# Patient Record
Sex: FEMALE | Race: WHITE
[De-identification: names, ages, dates, MRNs, and addresses within clinical notes are randomized per-mention and may not be internally consistent; named-entity substitution may affect disease eponyms.]

---

## 2020-09-22 ENCOUNTER — HOSPITAL ENCOUNTER (OUTPATIENT)
Dept: HOSPITAL 62 - OD | Age: 40
End: 2020-09-22
Payer: COMMERCIAL

## 2020-09-22 DIAGNOSIS — C50.811: Primary | ICD-10-CM

## 2020-09-22 PROCEDURE — 36415 COLL VENOUS BLD VENIPUNCTURE: CPT

## 2020-09-22 PROCEDURE — 84703 CHORIONIC GONADOTROPIN ASSAY: CPT

## 2020-09-23 ENCOUNTER — HOSPITAL ENCOUNTER (OUTPATIENT)
Dept: HOSPITAL 62 - RAD | Age: 40
End: 2020-09-23
Attending: PHYSICIAN ASSISTANT
Payer: COMMERCIAL

## 2020-09-23 DIAGNOSIS — C50.411: ICD-10-CM

## 2020-09-23 DIAGNOSIS — Z08: Primary | ICD-10-CM

## 2020-09-23 DIAGNOSIS — Z79.899: ICD-10-CM

## 2020-09-23 PROCEDURE — 93306 TTE W/DOPPLER COMPLETE: CPT

## 2020-09-24 NOTE — XCELERA REPORT
[address] [address]

                               Tel: [phone]

                               Fax: [phone]



                      Transthoracic Echocardiogram Report

_______________________________________________________________________________



Name: TOMMY AMARAL

MRN: [id number]                                Age: 40 yrs

Gender: Female                                 : 1980

Patient Status: Outpatient                     Patient Location: RAD

Account #: [id number]

Study Date: 2020 01:43 PM

Accession #: [id number]

_______________________________________________________________________________



Height: 66 in        Weight: 160 lb        BSA: 1.8 m2

_______________________________________________________________________________

Procedure: A two-dimensional transthoracic echocardiogram with color flow and

Doppler was performed. Study Quality: Good.

Reason For Study: CHEMOTHERAPY(Z51.11)



History: CHEMOTHERAPY(Z51.11).

Ordering Physician: PRISCILLA RIVAS



Performed By: Elis Elliott

_______________________________________________________________________________



Interpretation Summary

The left ventricle is normal in size.

There is normal left ventricular wall thickness.

LV EF is > than 60%

Left ventricular systolic function is normal.

Doppler measurements suggest normal left ventricular diastolic function

The left ventricular wall motion is normal.

There is no thrombus.

No ASD , VSD , or PFO.

The right ventricle is normal in size and function.

The right atrium is normal.

The left atrial size is normal.

There is no evidence of mitral valve prolapse.

There is no vegetation seen on the mitral valve.

There is no mitral valve stenosis.

There is a trace amount of mitral regurgitation

There is no aortic valvular vegetation.

There is no aortic valve stenosis

There is no LVOT obstruction.

No aortic regurgitation is present.

There is no tricuspid stenosis.

There is a trace amount of tricuspid regurgitation

Right ventricular systolic pressure is at the upper limits of normal

RVSP is 25 to 30 mm of Hg , with RA mean of 5 to 10.

There is no pulmonic valvular stenosis.

There is no pulmonic valvular regurgitation.

The aortic root is normal size.

The inferior vena cava appeared normal and decreased > 50% with respiration

(RAP 5-10 mmHg)

There is no pericardial effusion.



MMode/2D Measurements & Calculations

RVDd: 2.2 cm   LVIDd: 4.6 cm   FS: 32.5 %            Ao root diam: 2.4 cm



IVSd: 0.89 cm  LVIDs: 3.1 cm   EDV(Teich): 95.4 ml   Ao root area: 4.5 cm2

               LVPWd: 1.1 cm   ESV(Teich): 37.2 ml

                               EF(Teich): 61.0 %



Doppler Measurements & Calculations

MV E max fredy:       MV dec slope:        Ao V2 max:         LV V1 max P.4 cm/sec         769.9 cm/sec2        135.6 cm/sec       3.4 mmHg

MV A max fredy:       MV dec time: 0.12 secAo max PG:         LV V1 max:

86.5 cm/sec                              7.4 mmHg           91.7 cm/sec

MV E/A: 1.1

        _______________________________________________________________

PA V2 max:          TR max fredy:

75.2 cm/sec         226.4 cm/sec

PA max P.3 mmHg TR max P.6 mmHg





Left Ventricle

The left ventricle is normal in size. There is normal left ventricular wall

thickness. LV EF is > than 60%. Left ventricular systolic function is normal.

Doppler measurements suggest normal left ventricular diastolic function. The

left ventricular wall motion is normal. There is no thrombus. No ASD , VSD ,

or PFO.



Right Ventricle

The right ventricle is normal in size and function.



Atria

The right atrium is normal. The left atrial size is normal.



Mitral Valve

There is no evidence of mitral valve prolapse. There is no vegetation seen on

the mitral valve. There is no mitral valve stenosis. There is a trace amount

of mitral regurgitation.





Aortic Valve

There is no aortic valvular vegetation. There is no aortic valve stenosis.

There is no LVOT obstruction. No aortic regurgitation is present.



Tricuspid Valve

There is no tricuspid stenosis. There is a trace amount of tricuspid

regurgitation. Right ventricular systolic pressure is at the upper limits of

normal. RVSP is 25 to 30 mm of Hg , with RA mean of 5 to 10.



Pulmonic Valve

There is no pulmonic valvular stenosis. There is no pulmonic valvular

regurgitation.



Great Vessels

The aortic root is normal size. The inferior vena cava appeared normal and

decreased > 50% with respiration (RAP 5-10 mmHg).





Effusions

There is no pericardial effusion.



_______________________________________________________________________________

_______________________________________________________________________________



Electronically signed by:      Natalie Novoa      on 2020 03:08 PM



CC: PRISCILLA RIVAS, Natalie

## 2020-11-12 ENCOUNTER — HOSPITAL ENCOUNTER (OUTPATIENT)
Dept: HOSPITAL 62 - WI | Age: 40
End: 2020-11-12
Attending: NURSE PRACTITIONER
Payer: COMMERCIAL

## 2020-11-12 DIAGNOSIS — N64.52: ICD-10-CM

## 2020-11-12 DIAGNOSIS — C50.411: Primary | ICD-10-CM

## 2020-11-12 PROCEDURE — 77065 DX MAMMO INCL CAD UNI: CPT

## 2020-11-12 PROCEDURE — 76642 ULTRASOUND BREAST LIMITED: CPT

## 2020-11-12 NOTE — WOMENS IMAGING REPORT
EXAM DESCRIPTION:  3D DX MAMMO LEFT UNILAT; U/S BREAST UNILAT LIMITED



IMAGES COMPLETED DATE/TIME:  11/12/2020 10:30 am; 11/12/2020 10:55 am



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 
BLOODY DISCHARGE C50.411  MALIG NEOPLM OF UPPER-OUTER QUADRANT OF RIGHT FEMALE N64.52  NIPPLE DISCHAR
GE



COMPARISON:  2/20/2020



EXAM PARAMETERS:  Standard craniocaudal and mediolateral oblique images of the breast recorded using 
digital acquisition and breast tomosynthesis.

True lateral exaggerated CC views.

Read with the assistance of CAD.

.[facility] - R2  Version 9.2



LIMITATIONS:  None.



FINDINGS:  BREAST LATERALITY: left

MASSES: No suspicious masses.

CALCIFICATIONS: No new or suspicious calcifications.

ARCHITECTURAL DISTORTION: None.

ASYMMETRY: None noted.

OTHER: No other significant findings.

Ultrasound of the left breast was normal.



IMPRESSION:  No evidence of malignancy.



BREAST DENSITY:  b. There are scattered areas of fibroglandular density.



BIRAD:  ASSESSMENT:  1 Negative.



RECOMMENDATION:  RECOMMENDED FOLLOW UP: Birads 1 or 2: No breast imaging finding to explain the patie
nt's presenting complaint. Further intervention should be based on the degree of clinical suspicion.

SPECIFIC INTERVENTION/IMAGING/CONSULTATION RECOMMENDED:No additional intervention/ imaging/consultati
on needed at this time.

COMMUNICATION:The imaging findings were not discussed with the patient. Her referring provider has be
en notified of the findings.



COMMENT:  The patient has been notified of the results by letter per SA requirements. Additional no
tification policies are in place for contacting patient with suspicious or incomplete findings.

Quality ID #225: The American College of [employer] recommends an annual screening mammogram for women
 aged 40 years or over. This facility utilizes a reminder system to ensure that all patients receive 
reminder letters, and/or direct phone calls for appointments. This includes reminders for routine scr
eening mammograms, diagnostic mammograms, or other Breast Imaging Interventions when appropriate.  Th
is patient will be placed in the appropriate reminder system.



TECHNICAL DOCUMENTATION:  FINDING NUMBER: (1)

ASSESSMENT: (1)

JOB ID:  [id number]

 2011 [employer]- All Rights Reserved



Reading location - IP/workstation name: [email or web address]

## 2020-11-12 NOTE — WOMENS IMAGING REPORT
EXAM DESCRIPTION:  3D DX MAMMO LEFT UNILAT; U/S BREAST UNILAT LIMITED



IMAGES COMPLETED DATE/TIME:  11/12/2020 10:30 am; 11/12/2020 10:55 am



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 
BLOODY DISCHARGE C50.411  MALIG NEOPLM OF UPPER-OUTER QUADRANT OF RIGHT FEMALE N64.52  NIPPLE DISCHAR
GE



COMPARISON:  2/20/2020



EXAM PARAMETERS:  Standard craniocaudal and mediolateral oblique images of the breast recorded using 
digital acquisition and breast tomosynthesis.

True lateral exaggerated CC views.

Read with the assistance of CAD.

.[facility] - R2  Version 9.2



LIMITATIONS:  None.



FINDINGS:  BREAST LATERALITY: left

MASSES: No suspicious masses.

CALCIFICATIONS: No new or suspicious calcifications.

ARCHITECTURAL DISTORTION: None.

ASYMMETRY: None noted.

OTHER: No other significant findings.

Ultrasound of the left breast was normal.



IMPRESSION:  No evidence of malignancy.



BREAST DENSITY:  b. There are scattered areas of fibroglandular density.



BIRAD:  ASSESSMENT:  1 Negative.



RECOMMENDATION:  RECOMMENDED FOLLOW UP: Birads 1 or 2: No breast imaging finding to explain the patie
nt's presenting complaint. Further intervention should be based on the degree of clinical suspicion.

SPECIFIC INTERVENTION/IMAGING/CONSULTATION RECOMMENDED:No additional intervention/ imaging/consultati
on needed at this time.

COMMUNICATION:The imaging findings were not discussed with the patient. Her referring provider has be
en notified of the findings.



COMMENT:  The patient has been notified of the results by letter per SA requirements. Additional no
tification policies are in place for contacting patient with suspicious or incomplete findings.

Quality ID #225: The American College of Radiology recommends an annual screening mammogram for women
 aged 40 years or over. This facility utilizes a reminder system to ensure that all patients receive 
reminder letters, and/or direct phone calls for appointments. This includes reminders for routine scr
eening mammograms, diagnostic mammograms, or other Breast Imaging Interventions when appropriate.  Th
is patient will be placed in the appropriate reminder system.



TECHNICAL DOCUMENTATION:  FINDING NUMBER: (1)

ASSESSMENT: (1)

JOB ID:  [id number]

 2011 [employer]- All Rights Reserved



Reading location - IP/workstation name: [email or web address]

## 2020-11-13 NOTE — XMS REPORT
Patient Summary Document

                          Created on:2020



Patient:TOMMY AMARAL

Sex:Female

:1980

External Reference #:[id number]





Demographics







                          Address                   [address] [place] [address]

 

                          Home Phone                [phone]

 

                          Work Phone                [phone]

 

                          Mobile Phone              [phone]

 

                          Email Address             [email or web address]

 

                          Preferred Language        English

 

                          Marital Status            Unknown

 

                          Yazdanism Affiliation     Unknown

 

                          Race                      Unknown

 

                          Additional Race(s)        White



                                                    [id number]



                                                    Unavailable

 

                          Ethnic Group              Unknown









Author







                          Organization              [facility]

 

                          Address                   [facility] [address]



                                                    [place] [address]

 

                          Phone                     [phone]









Care Team Providers







                    Name                Role                Phone

 

                    PARAS MONSALVE  Primary Care Physician Unavailable

 

                    Ramesh RICHEY        Attending Clinician Unavailable

 

                    PURA            Attending Clinician Unavailable

 

                    KRISTINE BENITES   Attending Clinician Unavailable

 

                    ANKIT PADGETT       Attending Clinician Unavailable

 

                    KRISTINE BENITES   Attending Clinician Unavailable

 

                    ANKIT PADGETT       Attending Clinician Unavailable









Allergies, Adverse Reactions, Alerts







        Allergy Name Allergy Status  Severity Reaction(s) Onset   Inactive Treat

ing 

Comments



                Type                            Date    Date    Clinician 

 

        Adhesive Propensity Active          Other (See                   R

edness



                to adverse                 Comments) 8-14                    and



                reactions                         00:00:                  burnin

g



                to drug                         00                      

 

        Cephalexin Propensity Active          Swelling 0                  



                to adverse                         3-27                    



                reactions                         00:00:                  



                to drug                         00                      

 

        Erythromycin Propensity Active  Mild    Nausea And 0               

   



                to adverse                 Vomiting 4-10                    



                reactions                         00:00:                  



                to drug                         00                      

 

        Erythromycin Propensity Active  Low     Nausea And 2018-0               

   



                to adverse                 Vomiting 4-10                    



                reactions                         00:00:                  



                                                00                      

 

        Erythromycin Allergy to Active          Nausea/Vomit                    

     



        Base    Drug                    ing/Diarrhea                         



        (Ingredient( (Finding)                                                 



        s):                                                             



        erythromycin                                                         



        )                                                               







Medications







       Ordered Filled Start  Stop   Current Ordering Indication Dosage Frequency

 Signature

                          Comments                  Components



      Medication Medication Date  Date  Medication? Clinician                   

(SIG)       



      Name  Name                                                        

 

      Silvadene       2020       Yes               1                       



                  -                                                  



                  00:00:                                                 



                  00                                                    

 

      Ondansetron       2020       No                8mg         Ondansetro   

    



      HCl         0-23                                      n HCl       



                  00:00:                                                 



                  00                                                    

 

      Ado-Trastuz       2020       No                254mg       Ado-Trastu   

    



      umab        0-23                                      zumab       



      Emtansine       00:00:                                     Emtansine      

 



                  00                                                    

 

      Feraheme       2020       No                510mg       Feraheme       



                  0-15                                                  



                  00:00:                                                 



                  00                                                    

 

      Sodium       -       No                20mL        Sodium       



      Chloride       0-15                                      Chloride       



                  00:00:                                                 



                  00                                                    

 

      Sodium       -1       No                50mL        Sodium       



      Chloride       0-08                                      Chloride       



                  00:00:                                                 



                  00                                                    

 

      Feraheme       2020 2020- No                510mg       Feraheme       



                  0-08  10-15                                           



                  00:00: 00:00                                           



                  00    :00                                             

 

      Ondansetron       -       No                8mg         Ondansetro   

    



      HCl         0-02                                      n HCl       



                  00:00:                                                 



                  00                                                    

 

      Ado-Trastuz       2020       No                257mg       Ado-Trastu   

    



      umab        0-02                                      zumab       



      Emtansine       00:00:                                     Emtansine      

 



                  00                                                    

 

      Hydration       2020 2020- No                1000mL                   



      1000mL NS       0-02  10-                                           



                  00:00: 00:00                                           



                  00    :00                                             

 

      Sodium       -0       No                150mL       Sodium       



      Chloride       9-11                                      Chloride       



                  00:00:                                                 



                  00                                                    

 

      Ado-Trastuz              Yes               254mg       Ado-Trastu   

    



      umab        9-11                                      zumab       



      Emtansine       00:00:                                     Emtansine      

 



                  00                                                    

 

      Ondansetron        2020- No                8mg         Ondansetro   

    



      HCl         9-11  10-                               n HCl       



                  00:00: 00:00                                           



                  00    :00                                             

 

      sulfamethox        2020- No          [id number]       Q.5D  Take 2   

    



      azole-trime         09-05                               tablets       



      thoprim       00:00: 23:59                               (320 mg of       



      (BACTRIM       00    :00                                 trimethopr       



      DS) 800-160                                                 im total)     

  



      mg tablet                                                 by mouth 2      

 



                                                            (two)       



                                                            times       



                                                            daily for       



                                                            10 days       

 

      Pertuzumab              No                420mg       Pertuzumab    

   



                                                                    



                  00:00:                                                 



                  00                                                    

 

      Sodium       -0       No                80mL        Sodium       



      Chloride       819                                      Chloride       



                  00:00:                                                 



                  00                                                    

 

      Kanjinti              No                424mg       Kanjinti       



                                                                    



                  00:00:                                                 



                  00                                                    

 

      nitroGLYcer       2020- No          [id number] .5[in_u Q.5D  Place 0

.5       



      in          8-15  08-22                   s]          inches       



      (NITROBID)       00:00: 23:59                               onto the      

 



      2 %         00    :00                                 skin 2       



      ointment                                                 (two)       



                                                            times       



                                                            daily for       



                                                            7 days       



                                                            Apply to       



                                                            the         



                                                            mastectomy       



                                                            skin twice       



                                                            daily       

 

      mupirocin       2020- No                      Q.5D  Apply       



      (BACTROBAN)         08-21                               topically     

  



      2 %         00:00: 23:59                               2 (two)       



      ointment       00    :00                                 times       



                                                            daily for       



                                                            7 days       



                                                            Apply to       



                                                            incision       



                                                            and drain       



                                                            site twice       



                                                            daily       

 

      nitroGLYcer       2020- No          [id number] .5[in_u Q.5D  Place 0

.5       



      in          8-14  08-15                   s]          inches       



      (NITROBID)       00:00: 00:00                               onto the      

 



      2 %         00    :00                                 skin 2       



      ointment                                                 (two)       



                                                            times       



                                                            daily for       



                                                            7 days       



                                                            Apply to       



                                                            the         



                                                            mastectomy       



                                                            skin twice       



                                                            daily       

 

      oxyCODONE       -0 2020- No                5mg   Q6H   Take 1       



      (ROXICODONE                                      tablet (5     

  



      ) 5 MG       00:00: 00:00                               mg total)       



      immediate       00    :00                                 by mouth       



      release                                                 every 6       



      tablet                                                 (six)       



                                                            hours as       



                                                            needed for       



                                                            Pain for       



                                                            up to 30       



                                                            doses       

 

      acetaminoph       - 2020- No                650mg Q8H   Take 1       



      en                                         tablet       



      (TYLENOL)       00:00: 23:59                               (650 mg       



      650 MG ER       00    :00                                 total) by       



      tablet                                                 mouth       



                                                            every 8       



                                                            (eight)       



                                                            hours as       



                                                            needed for       



                                                            Pain for       



                                                            up to 10       



                                                            days        

 

      ondansetron        2020- No                4mg   Q8H   Take 1       



      (ZOFRAN-ODT                                      tablet (4     

  



      ) 4 MG       00:00: 23:59                               mg total)       



      disintegrat       00    :00                                 by mouth      

 



      ing tablet                                                 every 8       



                                                            (eight)       



                                                            hours as       



                                                            needed for       



                                                            Nausea for       



                                                            up to 7       



                                                            days        

 

      ondansetron       0 2020- No                4mg   Q6H   Take 1       



      (ZOFRAN                                      tablet (4       



      ODT) 4 MG       00:00: 00:00                               mg total)      

 



      disintegrat       00    :00                                 by mouth      

 



      ing tablet                                                 every 6       



                                                            (six)       



                                                            hours as       



                                                            needed for       



                                                            Nausea       

 

      oxyCODONE        2020- No                5mg   Q4H   Take 1       



      (ROXICODONE                                      tablet (5     

  



      ) 5 MG       00:00: 00:00                               mg total)       



      immediate       00    :00                                 by mouth       



      release                                                 every 4       



      tablet                                                 (four)       



                                                            hours as       



                                                            needed for       



                                                            Pain        

 

      docusate       -0 2020- No                100mg Q.5D  Take 1       



      (COLACE)                                      capsule       



      100 MG       00:00: 23:59                               (100 mg       



      capsule       00    :00                                 total) by       



                                                            mouth 2       



                                                            (two)       



                                                            times       



                                                            daily for       



                                                            10 days       

 

      LORazepam       2020-0 2020- No                .5mg  [id number] Take 1    

   



      (ATIVAN)                                [id number] tablet      

 



      0.5 MG       00:00: 23:59                         3D    (0.5 mg       



      tablet       00    :00                                 total) by       



                                                            mouth 3       



                                                            (three)       



                                                            times       



                                                            daily as       



                                                            needed for       



                                                            up to 5       



                                                            days        

 

      penicillin       2020-0 2020- No                500mg Q6H   Take 500      

 



      v potassium       7-27  08-06                               mg by       



      500 MG       00:00: 23:59                               mouth       



      tablet       00    :00                                 every 6       



                                                            (six)       



                                                            hours For       



                                                            10 days       

 

      Pertuzumab       2020-0       No                420mg       Pertuzumab    

   



                  7-13                                                  



                  00:00:                                                 



                  00                                                    

 

      Sodium       2020-0       No                120mL       Sodium       



      Chloride       7-13                                      Chloride       



                  00:00:                                                 



                  00                                                    

 

      Kanjinti       2020-0       No                416mg       Kanjinti       



                  7-13                                                  



                  00:00:                                                 



                  00                                                    

 

      Palonosetro       2020-0       No                .25mg       Palonosetr   

    



      n HCl       7-13                                      on HCl       



                  00:00:                                                 



                  00                                                    

 

      Dexamethaso       2020-0       No                20mg        Dexamethas   

    



      ne Sodium       7-13                                      one Sodium      

 



      Phosphate       00:00:                                     Phosphate      

 



                  00                                                    

 

      Famotidine       2020-0       No                20mg        Famotidine    

   



                  7-13                                                  



                  00:00:                                                 



                  00                                                    

 

      CARBOplatin       2020-0       No                750mg       CARBOplati   

    



                  7-13                                      n           



                  00:00:                                                 



                  00                                                    

 

      Pertuzumab       2020-0       No                420mg       Pertuzumab    

   



                  6-22                                                  



                  00:00:                                                 



                  00                                                    

 

      Sodium       2020-0       No                120mL       Sodium       



      Chloride       6-22                                      Chloride       



                  00:00:                                                 



                  00                                                    

 

      Kanjinti       2020-0       No                417mg       Kanjinti       



                  6-22                                                  



                  00:00:                                                 



                  00                                                    

 

      Palonosetro       2020-0       No                .25mg       Palonosetr   

    



      n HCl       6-22                                      on HCl       



                  00:00:                                                 



                  00                                                    

 

      Dexamethaso       2020-0       No                20mg        Dexamethas   

    



      ne Sodium       6-22                                      one Sodium      

 



      Phosphate       00:00:                                     Phosphate      

 



                  00                                                    

 

      Famotidine       2020-0       No                20mg        Famotidine    

   



                  6-22                                                  



                  00:00:                                                 



                  00                                                    

 

      CARBOplatin       2020-0       No                750mg       CARBOplati   

    



                  6-22                                      n           



                  00:00:                                                 



                  00                                                    

 

      Pertuzumab       2020-0       No                420mg       Pertuzumab    

   



                  6-01                                                  



                  00:00:                                                 



                  00                                                    

 

      Sodium       2020-0       No                120mL       Sodium       



      Chloride       6-01                                      Chloride       



                  00:00:                                                 



                  00                                                    

 

      Kanjinti       2020-0       No                411mg       Kanjinti       



                  6-01                                                  



                  00:00:                                                 



                  00                                                    

 

      Palonosetro       2020-0       No                .25mg       Palonosetr   

    



      n HCl       6-01                                      on HCl       



                  00:00:                                                 



                  00                                                    

 

      Dexamethaso       2020-0       No                20mg        Dexamethas   

    



      ne Sodium       6-01                                      one Sodium      

 



      Phosphate       00:00:                                     Phosphate      

 



                  00                                                    

 

      Famotidine       2020-0       No                20mg        Famotidine    

   



                  6-01                                                  



                  00:00:                                                 



                  00                                                    

 

      CARBOplatin       2020-0       No                701mg       CARBOplati   

    



                  6-01                                      n           



                  00:00:                                                 



                  00                                                    

 

      ALPRAZolam       2020-0       Yes               1                       



                  6-01                                                  



                  00:00:                                                 



                  00                                                    

 

      dexAMETHaso       2020-0 2020- No                                        



      ne          5-29  -                                           



      (DECADRON)       00:00: 00:00                                           



      4 MG tablet       00    :00                                             

 

      Diflucan       2020-0 2020- No                1                       



                  5-08  06-22                                           



                  00:00: 09:29                                           



                  00    :50                                             

 

      Pertuzumab       2020-0       No                420mg       Pertuzumab    

   



                  5-04                                                  



                  00:00:                                                 



                  00                                                    

 

      Sodium       2020-0       No                120mL       Sodium       



      Chloride       5-04                                      Chloride       



                  00:00:                                                 



                  00                                                    

 

      Kanjinti       2020-0       No                409mg       Kanjinti       



                  5-04                                                  



                  00:00:                                                 



                  00                                                    

 

      Palonosetro       2020-0       No                .25mg       Palonosetr   

    



      n HCl       5-04                                      on HCl       



                  00:00:                                                 



                  00                                                    

 

      Dexamethaso       2020-0       No                20mg        Dexamethas   

    



      ne Sodium       5-04                                      one Sodium      

 



      Phosphate       00:00:                                     Phosphate      

 



                  00                                                    

 

      Famotidine       2020-0       No                20mg        Famotidine    

   



                  5-04                                                  



                  00:00:                                                 



                  00                                                    

 

      CARBOplatin       2020-0       No                750mg       CARBOplati   

    



                  5-04                                      n           



                  00:00:                                                 



                  00                                                    

 

      Potassium       2020-0 2020- No                20meq                   



      Chloride ER       5-04  09-02                                           



                  00:00: 00:00                                           



                  00    :00                                             

 

      Pertuzumab       2020-0       No                420mg       Pertuzumab    

   



                  4-13                                                  



                  00:00:                                                 



                  00                                                    

 

      Sodium       2020-0       No                120mL       Sodium       



      Chloride       4-13                                      Chloride       



                  00:00:                                                 



                  00                                                    

 

      Kanjinti       2020-0       No                405mg       Kanjinti       



                  4-13                                                  



                  00:00:                                                 



                  00                                                    

 

      Palonosetro       2020-0       No                .25mg       Palonosetr   

    



      n HCl       4-13                                      on HCl       



                  00:00:                                                 



                  00                                                    

 

      Dexamethaso       2020-0       No                20mg        Dexamethas   

    



      ne Sodium       4-13                                      one Sodium      

 



      Phosphate       00:00:                                     Phosphate      

 



                  00                                                    

 

      Famotidine       2020-0       No                20mg        Famotidine    

   



                  4-13                                                  



                  00:00:                                                 



                  00                                                    

 

      CARBOplatin       2020-0       No                750mg       CARBOplati   

    



                  4-13                                      n           



                  00:00:                                                 



                  00                                                    

 

      Magic       2020-0 2020- No                10mL                    



      Mouthwash       -02  -                                           



                  00:00: 00:00                                           



                  00    :00                                             

 

      Dexamethaso       2020-0 2020- No                                        



      ne          4-02  -                                           



                  00:00: 08:57                                           



                  00    :16                                             

 

      Sodium       2020-0       Yes               20mL        Sodium       



      Chloride       3-23                                      Chloride       



                  00:00:                                                 



                  00                                                    

 

      Kanjinti       2020-0 2020- No                540mg       Kanjinti       



                  3-23  08-19                                           



                  00:00: 00:00                                           



                  00    :00                                             

 

      Pertuzumab       -0 2020- No                840mg       Pertuzumab    

   



                  3-23  08-19                                           



                  00:00: 00:00                                           



                  00    :00                                             

 

      Sodium       -0 - No                80mL                    



      Chloride       3-23  08-19                                           



                  00:00: 00:00                                           



                  00    :00                                             

 

      Dexamethaso       -0 2020- No                20mg        Dexamethas   

    



      ne Sodium       3-23  07-13                               one Sodium      

 



      Phosphate       00:00: 00:00                               Phosphate      

 



                  00    :00                                             

 

      DiphenhydrA       -0 - No                50mg                    



      MINE HCl       3-23  07-13                                           



                  00:00: 00:00                                           



                  00    :00                                             

 

      Famotidine       -0 - No                20mg        Famotidine    

   



                  3-23  07-13                                           



                  00:00: 00:00                                           



                  00    :00                                             

 

      Palonosetro       -0 2020- No                .25mg       Palonosetr   

    



      n HCl       3-23  07-13                               on HCl       



                  00:00: 00:00                                           



                  00    :00                                             

 

      CARBOplatin       -0 - No                750mg       CARBOplati   

    



                  3-23  07-13                               n           



                  00:00: 00:00                                           



                  00    :00                                             

 

      Docetaxel       -0 - No                134mg                   



                  3-23  07-13                                           



                  00:00: 00:00                                           



                  00    :00                                             

 

      Ondansetron       0       Yes               1                       



      HCl         3-10                                                  



                  00:00:                                                 



                  00                                                    

 

      Promethazin              Yes               25mg                    



      e HCl       3-10                                                  



                  00:00:                                                 



                  00                                                    

 

      ALPRAZolam       -       Yes               .5MG        Take 0.5 Take 

0.5 



      (XANAX) 0.5       2-12                                      mg by mg by 



      MG tablet       10:57:                                     mouth mouth 



                  51                                        Three (3) Three (3) 



                                                            times a times a 



                                                            day.  day.  

 

      potassium                   Yes                           Take by       



      chloride                                                 mouth       



      (KLOR-CON)                                                             



      10 MEQ ER                                                             



      tablet                                                             

 

      sertraline                   Yes               150mg QD    Take 150       



      (ZOLOFT)                                                 mg by       



      100 MG                                                 mouth once       



      tablet                                                 daily       

 

      omeprazole                   Yes               40mg  QD    Take 40 mg     

  



      (PRILOSEC)                                                 by mouth       



      40 MG DR                                                 once daily       



      capsule                                                             

 

      pediatric                   Yes               1{tbl} QD    Take 1       



      multivitami                                                 tablet by     

  



      n                                                     mouth once       



      (FLINTSTONE                                                 daily       



      S/EXTRA C)                                                             



      chewable                                                             



      tablet                                                             

 

      ALPRAZolam                   Yes               .5mg        Take 0.5       



      (XANAX) 0.5                                                 mg by       



      MG tablet                                                 mouth 3       



                                                            (three)       



                                                            times a       



                                                            day         

 

      VITAMIN D3                   Yes               5000U QD    Take 5,000     

  



      ORAL                                                  Units by       



                                                            mouth once       



                                                            daily       

 

      cetirizine                   Yes               10mg  QD    Take 10 mg     

  



      (ZYRTEC) 10                                                 by mouth      

 



      MG tablet                                                 once daily      

 

 

      fluticasone                   Yes               2{spray QD    Place 2     

  



      propionate                                     }           sprays       



      (FLONASE)                                                 into both       



      50                                                    nostrils       



      mcg/actuati                                                 once daily    

   



      on nasal                                                             



      spray                                                             

 

      Multivitami                   Yes               1                       



      ns                                                                

 

      PriLOSEC                   Yes               1                       

 

      Zoloft                   Yes               1                       

 

      Zyrtec                   No                1                       

 

      Claritin                   Yes               1                       

 

      Zyrtec             2020- No                1                       



                        10-23                                           



                        09:53                                           



                        :52                                             

 

      ascorbic             2020- No                3{capsu QD    Take 3       



      acid/vitami                                le}         capsules      

 



      n E/biotin             00:00                               by mouth       



      (HAIR,             :00                                 once daily       



      SKIN, NAILS                                                             



      WITH BIOTIN                                                             



      ORAL)                                                             

 

      fexofenadin             2020- No                180mg QD    Take 180      

 



      e (ALLEGRA)             07-27                               mg by       



      180 MG             00:00                               mouth once       



      tablet             :00                                 daily       

 

      cetirizine             - No                            Take by       



      HCl/pseudoe                                            mouth       



      phedrine             00:00                                           



      (ZYRTEC-D             :00                                             



      ORAL)                                                             

 

      POTASSIUM             - No                            Take by       



      CHLORIDE                                            mouth       



      ORAL              00:00                                           



                        :00                                             

 

      Allegra             2020- No                1                       



      Allergy                                                        



                        09:31                                           



                        :12                                             

 

      predniSONE             2020- No                3                       



                                                                   



                        09:24                                           



                        :51                                             







Problems







        Condition Condition Condition Status  Onset   Resolution Last    Treatin

g Comments



        Name    Details Category         Date    Date    Treatment Clinician 



                                                        Date            

 

        Dehydration Dehydration Diagnosis active  2020                            



                                        00:00:                          



                                        00                              

 

        Breast  Breast  [id number] Active  2020         



        hematoma hematoma                             12:06:30         



                                        00:00:                          



                                        00                              

 

        S/P right S/P right [id number] Active  2020         



        mastectomy mastectomy                             17:34:00         



                                        00:00:                          



                                        00                              

 

        Patient Patient Diagnosis active                            



        encounter encounter                 3-23                            



        status  status                  00:00:                          



                                        00                              

 

        Malignant Malignant [id number] Active  2020         



        neoplasm of neoplasm of                 3-13            14:57:17        

 



        upper-outer upper-outer                 00:00:                          



        quadrant of quadrant of                 00                              



        right   right                                                   



        breast in breast in                                                 



        female, female,                                                 



        estrogen estrogen                                                 



        receptor receptor                                                 



        positive positive                                                 

 

        Anxiety Anxiety [id number] Active  2020         



                                        2-            12:26:54         



                                        00:00:                          



                                        00                              

 

        Heart   Heart   [id number] Active  2020         



        palpitation palpitation                             12:26:54        

 



        s       s                       00:00:                          



                                        00                              

 

        Gastroesoph Gastroesoph [id number] Active  2020     

    



        ageal   ageal                   4-10            12:26:54         



        reflux  reflux                  00:00:                          



        disease disease                 00                              



        without without                                                 



        esophagitis esophagitis                                                 

 

        Snoring Snoring [id number] Active  2020-10            12:26:54         



                                        00:00:                          



                                        00                              

 

        Eruption Eruption Diagnosis active                                  



        due to drug due to drug                                                 

 

        Sarcoma Sarcoma Diagnosis active                                  



        upper outer upper outer                                                 



        quadrant of quadrant of                                                 



        female  female                                                  



        breast  breast                                                  

 

        Drug-induce Drug-induce Diagnosis active                                

  



        d mucositis d mucositis                                                 

 

        Intestinal Intestinal Diagnosis active                                  



        malabsorpti malabsorpti                                                 



        on      on                                                      

 

        Iron    Iron    Diagnosis active                                  



        deficiency deficiency                                                 



        anemia  anemia                                                  







Procedures







                Procedure       Date / Time Performed Performing Clinician Deviana

e

 

                CORONAVIRUS (COVID-19) SARS-COV-2 2020-08-10 10:30:00 Kiya Uribe 



                PCR PREOPERATIVE SCREEN                                 

 

                BASIC METABOLIC PANEL (BMP) 2020 11:14:00 Elis Uribe

ndace 

 

                COMPLETE BLOOD COUNT (CBC) 2020 11:14:00 Elis Uribe

dace 

 

                breast biopsy   2020 00:00:00                 

 

                Oral surgery                                    

 

                Thyroid Biopsy                                  

 

                caesarean section                                 







Results







           Test Description Test Time  Test Comments Text Results Atomic Results

 Result 

Comments









                FSH             2020 11:30:00                 









                          Test Item    Value        Reference Range Comments









                FSH (test code = FSH) 11.8000         1.5000-116.3000 

 

                LH (test code = LH) 8.7000          0.5000-76.3000  

 

                Estradiol (test code = Estradiol) 165.0000 pg/mL                

  



[id number] 10:14:00





                Test Item       Value           Reference Range Comments

 

                WBC (test code = WBC) 3.0000          4.0000-10.0000  

 

                Lymphocytes % (test code = Lymphocytes %) 19.4000 %       22.400

0-43.6000 

 

                MID% (test code = MID%) 6.7000 %        1.2000-11.2000  

 

                Neutrophils % (test code = Neutrophils %) 73.9000 %       48.900

0-69.9000 

 

                Lymphocytes (test code = Lymphocytes) 0.5000          1.2000-3.2

000   

 

                MID (test code = MID) 0.3000          0.1000-1.1000   

 

                Neutrophils (test code = Neutrophils) 2.2000          1.5000-6.7

000   

 

                RBC (test code = RBC) 3.3400          3.7000-4.9000   

 

                HGB (test code = HGB) 11.5000 g/dL    11.2000-18.0000 

 

                HCT (test code = HCT) 33.6000 %       34.0000-44.0000 

 

                MCV (test code = MCV) 100.6000 fL     80.0000-94.0000 

 

                MCH (test code = MCH) 34.6000 pg      27.0000-34.0000 

 

                MCHC (test code = MCHC) 34.3000 g/dL    31.5000-36.0000 

 

                RDW (test code = RDW) 14.6000         11.0000-18.0000 

 

                PLT (test code = PLT) 171.0000        140.0000-440.0000 

 

                MPV (test code = MPV) 7.8000 fL       6.8000-10.6000  



[id number] 09:29:00





                Test Item       Value           Reference Range Comments

 

                WBC (test code = WBC) 2.5000          4.0000-10.0000  

 

                Lymphocytes % (test code = Lymphocytes %) 23.3000 %       22.400

0-43.6000 

 

                MID% (test code = MID%) 7.1000 %        1.2000-11.2000  

 

                Neutrophils % (test code = Neutrophils %) 69.6000 %       48.900

0-69.9000 

 

                Lymphocytes (test code = Lymphocytes) 0.5000          1.2000-3.2

000   

 

                MID (test code = MID) 0.3000          0.1000-1.1000   

 

                Neutrophils (test code = Neutrophils) 1.7000          1.5000-6.7

000   

 

                RBC (test code = RBC) 3.2000          3.7000-4.9000   

 

                HGB (test code = HGB) 10.9000 g/dL    11.2000-18.0000 

 

                HCT (test code = HCT) 32.1000 %       34.0000-44.0000 

 

                MCV (test code = MCV) 100.1000 fL     80.0000-94.0000 

 

                MCH (test code = MCH) 34.1000 pg      27.0000-34.0000 

 

                MCHC (test code = MCHC) 34.0000 g/dL    31.5000-36.0000 

 

                RDW (test code = RDW) 14.8000         11.0000-18.0000 

 

                PLT (test code = PLT) 126.0000        140.0000-440.0000 

 

                MPV (test code = MPV) 8.5000 fL       6.8000-10.6000  



[id number] 09:26:00





                Test Item       Value           Reference Range Comments

 

                WBC (test code = WBC) 3.0000          4.0000-10.0000  

 

                Lymphocytes % (test code = Lymphocytes %) 29.6000 %       22.400

0-43.6000 

 

                MID% (test code = MID%) 7.1000 %        .-  

 

                Neutrophils % (test code = Neutrophils %) 63.3000 %       48.900

0-69.9000 

 

                Lymphocytes (test code = Lymphocytes) 0.9000          1.2000-3.2

000   

 

                MID (test code = MID) 0.2000          0.1000-1.1000   

 

                Neutrophils (test code = Neutrophils) 1.9000          1.5000-6.7

000   

 

                RBC (test code = RBC) 3.5100          3.7000-4.9000   

 

                HGB (test code = HGB) 12.1000 g/dL    11.-18.0000 

 

                HCT (test code = HCT) 34.5000 %       34.0000-44.0000 

 

                MCV (test code = MCV) 98.3000 fL      80.0000-94.0000 

 

                MCH (test code = MCH) 34.5000 pg      27.0000-34.0000 

 

                MCHC (test code = MCHC) 35.1000 g/dL    31.5000-36.0000 

 

                RDW (test code = RDW) 14.0000         11.0000-18.0000 

 

                PLT (test code = PLT) 109.0000        140.0000-440.0000 

 

                MPV (test code = MPV) 8.4000 fL       6.8000-10.6000  



[id number] 09:36:00





                Test Item       Value           Reference Range Comments

 

                WBC (test code = WBC) 4.2000          4.0000-10.0000  

 

                Lymphocytes % (test code = Lymphocytes %) 26.0000 %       22.400

0-43.6000 

 

                MID% (test code = MID%) 7.3000 %        .-  

 

                Neutrophils % (test code = Neutrophils %) 66.7000 %       48.900

0-69.9000 

 

                Lymphocytes (test code = Lymphocytes) 1.1000          1.2000-3.2

000   

 

                MID (test code = MID) 0.3000          0.1000-1.1000   

 

                Neutrophils (test code = Neutrophils) 2.8000          1.5000-6.7

000   

 

                RBC (test code = RBC) 3.7200          3.7000-4.9000   

 

                HGB (test code = HGB) 12.4000 g/dL    11.2000-18.0000 

 

                HCT (test code = HCT) 37.1000 %       34.0000-44.0000 

 

                MCV (test code = MCV) 99.5000 fL      80.0000-94.0000 

 

                MCH (test code = MCH) 33.3000 pg      27.0000-34.0000 

 

                MCHC (test code = MCHC) 33.4000 g/dL    31.5000-36.0000 

 

                RDW (test code = RDW) 14.7000         11.0000-18.0000 

 

                PLT (test code = PLT) 270.0000        140.0000-440.0000 

 

                MPV (test code = MPV) 8.4000 fL       6.8000-10.6000  



[id number] 09:36:00





                Test Item       Value           Reference Range Comments

 

                Creatinine (test code = Creatinine) 0.6000 mg/dL    0.5000-1.200

0   

 

                Cr Clearance (Est) (test code = Cr 139.7200        75.0000-115.0

000 



                Clearance (Est))                                 

 

                Glucose (test code = Glucose) 89.0000 mg/dL   70.0000-118.0000 

 

                BUN (test code = BUN) 18.0000 mg/dL   7.0000-22.0000  

 

                Sodium (test code = Sodium) 137.0000 mmol/L 128.0000-145.0000 

 

                Potassium (test code = Potassium) 3.8000 mmol/L   3.6000-5.1000 

  

 

                Chloride (test code = Chloride) 102.0000 mmol/L 96.0000-108.0000

 

 

                CO2 (test code = CO2) 29.0000 mmol/L  18.0000-33.0000 

 

                Calcium (test code = Calcium) 9.1600 mg/dL    8.0000-10.3000  

 

                Alkaline Phosphatase (test code = Alkaline 56.0000         42.00

.0000 



                Phosphatase)                                    

 

                ALT (SGPT) (test code = ALT (SGPT)) 17.0000         10.0000-47.0

000 

 

                AST (SGOT) (test code = AST (SGOT)) 22.0000         11.0000-37.0

000 

 

                Bilirubin, Total (test code = Bilirubin, 0.5000 mg/dL    0.0000-

1.6000   



                Total)                                          

 

                Albumin (test code = Albumin) 4.4000 g/dL     3.5000-5.5000   

 

                Protein, Total (test code = Protein, 6.4000 g/dL     6.4000-8.10

00   



                Total)                                          

 

                eGFR -American (test code = eGFR 135.0000        60.0000-

200.0000 



                -American)                                 

 

                eGFR Non--American (test code = 111.0000        60.0000-2

00.0000 



                eGFR Non--American)                                 



[id number] 08:28:00





                Test Item       Value           Reference Range Comments

 

                WBC (test code = WBC) 5.2000          4.0000-10.0000  

 

                Lymphocytes % (test code = Lymphocytes %) 35.0000 %       22.400

0-43.6000 

 

                MID% (test code = MID%) 7.5000 %        1.-11.  

 

                Neutrophils % (test code = Neutrophils %) 57.5000 %       48.900

0-69.9000 

 

                Lymphocytes (test code = Lymphocytes) 1.8000          1.2000-3.2

000   

 

                MID (test code = MID) 0.4000          0.1000-1.1000   

 

                Neutrophils (test code = Neutrophils) 3.0000          1.5000-6.7

000   

 

                RBC (test code = RBC) 3.2400          3.7000-4.9000   

 

                HGB (test code = HGB) 11.1000 g/dL    11.2000-18.0000 

 

                HCT (test code = HCT) 32.8000 %       34.0000-44.0000 

 

                MCV (test code = MCV) 101.3000 fL     80.0000-94.0000 

 

                MCH (test code = MCH) 34.4000 pg      27.0000-34.0000 

 

                MCHC (test code = MCHC) 34.0000 g/dL    31.5000-36.0000 

 

                RDW (test code = RDW) 14.4000         11.0000-18.0000 

 

                PLT (test code = PLT) 197.0000        140.0000-440.0000 

 

                MPV (test code = MPV) 9.1000 fL       6.8000-10.6000  



[id number] 08:33:00





                Test Item       Value           Reference Range Comments

 

                WBC (test code = WBC) 4.9000          4.0000-10.0000  

 

                Lymphocytes % (test code = Lymphocytes %) 39.4000 %       22.400

0-43.6000 

 

                MID% (test code = MID%) 8.0000 %        .-11.  

 

                Neutrophils % (test code = Neutrophils %) 52.6000 %       48.900

0-69.9000 

 

                Lymphocytes (test code = Lymphocytes) 1.9000          1.2000-3.2

000   

 

                MID (test code = MID) 0.5000          0.1000-1.1000   

 

                Neutrophils (test code = Neutrophils) 2.5000          1.5000-6.7

000   

 

                RBC (test code = RBC) 3.3900          3.7000-4.9000   

 

                HGB (test code = HGB) 11.9000 g/dL    11.2000-18.0000 

 

                HCT (test code = HCT) 34.2000 %       34.0000-44.0000 

 

                MCV (test code = MCV) 101.0000 fL     80.0000-94.0000 

 

                MCH (test code = MCH) 35.1000 pg      27.0000-34.0000 

 

                MCHC (test code = MCHC) 34.7000 g/dL    31.5000-36.0000 

 

                RDW (test code = RDW) 14.4000         11.0000-18.0000 

 

                PLT (test code = PLT) 184.0000        140.0000-440.0000 

 

                MPV (test code = MPV) 8.1000 fL       6.8000-10.6000  



[id number] 10:09:00





                Test Item       Value           Reference Range Comments

 

                Creatinine (test code = Creatinine) 0.8000 mg/dL    0.5000-1.200

0   

 

                Cr Clearance (Est) (test code = Cr 106.1400        75.0000-115.0

000 



                Clearance (Est))                                 

 

                Glucose (test code = Glucose) 88.0000 mg/dL   70.0000-118.0000 

 

                BUN (test code = BUN) 19.0000 mg/dL   7.0000-22.0000  

 

                Sodium (test code = Sodium) 140.0000 mmol/L 128.0000-145.0000 

 

                Potassium (test code = Potassium) 3.8000 mmol/L   3.6000-5.1000 

  

 

                Chloride (test code = Chloride) 104.0000 mmol/L 96.0000-108.0000

 

 

                CO2 (test code = CO2) 31.0000 mmol/L  18.0000-33.0000 

 

                Calcium (test code = Calcium) 9.6100 mg/dL    8.0000-10.3000  

 

                Alkaline Phosphatase (test code = Alkaline 66.0000         42.00

.0000 



                Phosphatase)                                    

 

                ALT (SGPT) (test code = ALT (SGPT)) 26.0000         10.0000-47.0

000 

 

                AST (SGOT) (test code = AST (SGOT)) 26.0000         11.0000-37.0

000 

 

                Bilirubin, Total (test code = Bilirubin, 0.8000 mg/dL    0.0000-

1.6000   



                Total)                                          

 

                Albumin (test code = Albumin) 4.5000 g/dL     3.5000-5.5000   

 

                Protein, Total (test code = Protein, 6.9000 g/dL     6.4000-8.10

00   



                Total)                                          

 

                eGFR -American (test code = eGFR 97.0000         60.0000-

200.0000 



                -American)                                 

 

                eGFR Non--American (test code = 80.0000         60.0000-2

00.0000 



                eGFR Non--American)                                 



[id number] 10:08:00





                Test Item       Value           Reference Range Comments

 

                WBC (test code = WBC) 4.3000          4.0000-10.0000  

 

                Lymphocytes % (test code = Lymphocytes %) 43.3000 %       22.400

0-43.6000 

 

                MID% (test code = MID%) 7.3000 %        1.2000-11.  

 

                Neutrophils % (test code = Neutrophils %) 49.4000 %       48.900

0-69.9000 

 

                Lymphocytes (test code = Lymphocytes) 1.8000          1.2000-3.2

000   

 

                MID (test code = MID) 0.4000          0.1000-1.1000   

 

                Neutrophils (test code = Neutrophils) 2.1000          1.5000-6.7

000   

 

                RBC (test code = RBC) 3.3300          3.7000-4.9000   

 

                HGB (test code = HGB) 11.3000 g/dL    11.2000-18.0000 

 

                HCT (test code = HCT) 34.0000 %       34.0000-44.0000 

 

                MCV (test code = MCV) 102.1000 fL     80.0000-94.0000 

 

                MCH (test code = MCH) 34.1000 pg      27.0000-34.0000 

 

                MCHC (test code = MCHC) 33.4000 g/dL    31.5000-36.0000 

 

                RDW (test code = RDW) 14.6000         11.0000-18.0000 

 

                PLT (test code = PLT) 326.0000        140.0000-440.0000 

 

                MPV (test code = MPV) 7.9000 fL       6.8000-10.6000  



[id number] 11:37:00





                Test Item       Value           Reference Range Comments

 

                WBC (test code = WBC) 3.8000          4.0000-10.0000  

 

                Lymphocytes % (test code = Lymphocytes %) 41.4000 %       22.400

0-43.6000 

 

                MID% (test code = MID%) 7.2000 %        1.2000-11.  

 

                Neutrophils % (test code = Neutrophils %) 51.4000 %       48.900

0-69.9000 

 

                Lymphocytes (test code = Lymphocytes) 1.5000          1.2000-3.2

000   

 

                MID (test code = MID) 0.4000          0.1000-1.1000   

 

                Neutrophils (test code = Neutrophils) 1.9000          1.5000-6.7

000   

 

                RBC (test code = RBC) 3.0500          3.7000-4.9000   

 

                HGB (test code = HGB) 10.4000 g/dL    11.2000-18.0000 

 

                HCT (test code = HCT) 31.8000 %       34.0000-44.0000 

 

                MCV (test code = MCV) 104.3000 fL     80.0000-94.0000 

 

                MCH (test code = MCH) 34.4000 pg      27.0000-34.0000 

 

                MCHC (test code = MCHC) 32.9000 g/dL    31.5000-36.0000 

 

                RDW (test code = RDW) 14.4000         11.0000-18.0000 

 

                PLT (test code = PLT) 74.0000         140.0000-440.0000 

 

                MPV (test code = MPV) 9.5000 fL       6.8000-10.6000  



Iron, [id number] 12:59:00





                Test Item       Value           Reference Range Comments

 

                Iron, Total (test code = Iron, Total) 75.0000         27.0000-15

9.0000 

 

                TIBC (test code = TIBC) 347.0000        250.0000-450.0000 

 

                UIBC (test code = UIBC) 272.0000        131.0000-425.0000 

 

                % Iron Saturation (test code = % Iron 22.0000 %       15.0000-55

.0000 



                Saturation)                                     

 

                Ferritin (test code = Ferritin) 54.0000 ng/mL   15.0000-150.0000

 



[id number] 11:04:00





                Test Item       Value           Reference Range Comments

 

                WBC (test code = WBC) 3.8000          4.0000-10.0000  

 

                Lymphocytes % (test code = Lymphocytes %) 28.3000 %       22.400

0-43.6000 

 

                MID% (test code = MID%) 8.4000 %        1.2000-11.  

 

                Neutrophils % (test code = Neutrophils %) 63.3000 %       48.900

0-69.9000 

 

                Lymphocytes (test code = Lymphocytes) 1.1000          1.2000-3.2

000   

 

                MID (test code = MID) 0.3000          0.1000-1.1000   

 

                Neutrophils (test code = Neutrophils) 2.4000          1.5000-6.7

000   

 

                RBC (test code = RBC) 2.8300          3.7000-4.9000   

 

                HGB (test code = HGB) 10.3000 g/dL    11.2000-18.0000 

 

                HCT (test code = HCT) 30.0000 %       34.0000-44.0000 

 

                MCV (test code = MCV) 105.9000 fL     80.0000-94.0000 

 

                MCH (test code = MCH) 36.6000 pg      27.0000-34.0000 

 

                MCHC (test code = MCHC) 34.6000 g/dL    31.5000-36.0000 

 

                RDW (test code = RDW) 14.5000         11.0000-18.0000 

 

                PLT (test code = PLT) 315.0000        140.0000-440.0000 

 

                MPV (test code = MPV) 7.5000 fL       6.8000-10.6000  



[id number] 11:04:00





                Test Item       Value           Reference Range Comments

 

                Creatinine (test code = Creatinine) 0.7000 mg/dL    0.5000-1.200

0   

 

                Cr Clearance (Est) (test code = Cr 123.1600        75.0000-115.0

000 



                Clearance (Est))                                 

 

                Glucose (test code = Glucose) 102.0000 mg/dL  70.0000-118.0000 

 

                BUN (test code = BUN) 11.0000 mg/dL   7.0000-22.0000  

 

                Sodium (test code = Sodium) 141.0000 mmol/L 128.0000-145.0000 

 

                Potassium (test code = Potassium) 3.8000 mmol/L   3.6000-5.1000 

  

 

                Chloride (test code = Chloride) 107.0000 mmol/L 96.0000-108.0000

 

 

                CO2 (test code = CO2) 32.0000 mmol/L  18.0000-33.0000 

 

                Calcium (test code = Calcium) 9.3800 mg/dL    8.0000-10.3000  

 

                Alkaline Phosphatase (test code = Alkaline 59.0000         42.00

.0000 



                Phosphatase)                                    

 

                ALT (SGPT) (test code = ALT (SGPT)) 9.0000          10.0000-47.0

000 

 

                AST (SGOT) (test code = AST (SGOT)) 12.0000         11.0000-37.0

000 

 

                Bilirubin, Total (test code = Bilirubin, 0.6000 mg/dL    0.0000-

1.6000   



                Total)                                          

 

                Albumin (test code = Albumin) 4.0000 g/dL     3.5000-5.5000   

 

                Protein, Total (test code = Protein, 6.1000 g/dL     6.4000-8.10

00   



                Total)                                          

 

                eGFR -American (test code = eGFR 113.0000        60.0000-

200.0000 



                -American)                                 

 

                eGFR Non--American (test code = 94.0000         60.0000-2

00.0000 



                eGFR Non--American)                                 



[id number] 10:59:00





                Test Item       Value           Reference Range Comments

 

                WBC (test code = WBC) 4.2000          4.0000-10.0000  

 

                Lymphocytes % (test code = Lymphocytes %) 34.2000 %       22.400

0-43.6000 

 

                MID% (test code = MID%) 7.6000 %        1.2000-11.2000  

 

                Neutrophils % (test code = Neutrophils %) 58.2000 %       48.900

0-69.9000 

 

                Lymphocytes (test code = Lymphocytes) 1.4000          1.2000-3.2

000   

 

                MID (test code = MID) 0.4000          0.1000-1.1000   

 

                Neutrophils (test code = Neutrophils) 2.4000          1.5000-6.7

000   

 

                RBC (test code = RBC) 2.9400          3.7000-4.9000   

 

                HGB (test code = HGB) 10.7000 g/dL    11.2000-18.0000 

 

                HCT (test code = HCT) 31.8000 %       34.0000-44.0000 

 

                MCV (test code = MCV) 108.2000 fL     80.0000-94.0000 

 

                MCH (test code = MCH) 36.3000 pg      27.0000-34.0000 

 

                MCHC (test code = MCHC) 33.5000 g/dL    31.5000-36.0000 

 

                RDW (test code = RDW) 15.8000         11.0000-18.0000 

 

                PLT (test code = PLT) 361.0000        140.0000-440.0000 

 

                MPV (test code = MPV) 7.3000 fL       6.8000-10.6000  



[id number] 10:59:00





                Test Item       Value           Reference Range Comments

 

                Creatinine (test code = Creatinine) 0.6000 mg/dL    0.5000-1.200

0   

 

                Cr Clearance (Est) (test code = Cr 140.2600        75.0000-115.0

000 



                Clearance (Est))                                 

 

                Glucose (test code = Glucose) 122.0000 mg/dL  70.0000-118.0000 

 

                BUN (test code = BUN) 12.0000 mg/dL   7.0000-22.0000  

 

                Sodium (test code = Sodium) 140.0000 mmol/L 128.0000-145.0000 

 

                Potassium (test code = Potassium) 3.2000 mmol/L   3.6000-5.1000 

  

 

                Chloride (test code = Chloride) 103.0000 mmol/L 96.0000-108.0000

 

 

                CO2 (test code = CO2) 23.0000 mmol/L  18.0000-33.0000 

 

                Calcium (test code = Calcium) 8.9500 mg/dL    8.0000-10.3000  

 

                Alkaline Phosphatase (test code = Alkaline 60.0000         42.00

.0000 



                Phosphatase)                                    

 

                ALT (SGPT) (test code = ALT (SGPT)) 28.0000         10.0000-47.0

000 

 

                AST (SGOT) (test code = AST (SGOT)) 24.0000         11.0000-37.0

000 

 

                Bilirubin, Total (test code = Bilirubin, 0.7000 mg/dL    0.0000-

1.6000   



                Total)                                          

 

                Albumin (test code = Albumin) 4.0000 g/dL     3.5000-5.5000   

 

                Protein, Total (test code = Protein, 6.0000 g/dL     6.4000-8.10

00   



                Total)                                          

 

                eGFR -American (test code = eGFR 135.0000        60.0000-

200.0000 



                -American)                                 

 

                eGFR Non--American (test code = 111.0000        60.0000-2

00.0000 



                eGFR Non--American)                                 



CORONAVIRUS (COVID-19) SARS-COV-2 PCR PREOPERATIVE [id number] 01:20:00





                Test Item       Value           Reference Range Comments

 

                Coronavirus (COVID-19) SARS-CoV-2 PCR (test Not Detected        

            



                code = 73432-8)                                 

 

                LANCE (test code = LANCE)                                 

 

                Lab Interpretation (test code = 88594-3) Normal                 

         



CORONAVIRUS (COVID-19) SARS-COV-2 PCR PREOPERATIVE [id number] 01:20:00





                Test Item       Value           Reference Range Comments

 

                Coronavirus (COVID-19) SARS-CoV-2 PCR (test Not Detected        

            



                code = 17808-4)                                 

 

                LANCE (test code = LANCE)                                 

 

                Lab Interpretation (test code = 77708-7) Normal                 

         



Basic Metabolic Panel (BMP)2020 15:10:00





                Test Item       Value           Reference Range Comments

 

                Sodium (test code = 2951-2) 142 mmol/L      135-145         

 

                Potassium (test code = 2823-3) 3.1 mmol/L      3.5-5           

 

                Chloride (test code = 2075-0) 104 mmol/L                

 

                Carbon Dioxide (CO2) (test 24 mmol/L       21-30           



                code = -9)                                  

 

                Urea Nitrogen (BUN) (test code 6 mg/dL         7-20            



                = 3094-0)                                       

 

                Creatinine (test code = 0.7 mg/dL       0.4-1           



                2160-0)                                         

 

                Glucose (test code = 2345-7) 85 mg/dL                  Int

erpretive Data: Above is



                                                                the NONFASTING r

eference



                                                                range. Below are

 the FASTING



                                                                reference ranges

: NORMAL: 



                                                                 70-99 mg/dL



                                                                PREDIABETES: 100

-125 mg/dL



                                                                DIABETES:  >

 125 mg/dL

 

                Calcium (test code = 02535-5) 9.3 mg/dL       8.7-10.2        

 

                Anion Gap (test code = 14 mmol/L       3-12            



                92052-5)                                        

 

                BUN/CREA Ratio (test code = 9                           



                81069380)                                       

 

                Glomerular Filtration Rate 109             mL/min/1.73sq m Inter

pretive Ranges eGFR



                (eGFR)  (test code = 33914-3)                                 (C

KD-EPI): eGFR:   >



                                                                60 mL/min/1.73 s

q m - Normal



                                                                eGFR:   30

 - 59



                                                                mL/min/1.73 sq m

 -



                                                                Moderately Decre

ased eGFR:



                                                                  15 - 29 

mL/min/1.73



                                                                sq m - Severely 

Decreased



                                                                eGFR:   < 

15



                                                                mL/min/1.73 sq m

 - Kidney



                                                                Failure Note: Th

denise GFR



                                                                calculations do 

not apply in



                                                                acute situations

 when GFR is



                                                                changing rapidly

 or in



                                                                patients on dial

ysis.

 

                Lab Interpretation (test code Abnormal                        



                = 77913-9)                                      



Complete Blood Count (CBC)2020 15:03:00





                Test Item       Value           Reference Range Comments

 

                    WBC (White Blood Cell Count) 2.5                 3.2 - 9.8 x

10

9                                       



                (test code = 44045-6)                 /L              

 

                Hemoglobin (test code = 10.1 g/dL       12-15.5         



                718-7)                                          

 

                Hematocrit (test code = 30.0 %          35-45           



                4544-3)                                         

 

                    Platelets (test code = 260                 150 - 450 x10

9                                       



                56998-1)                        /L              

 

                MCV (Mean Corpuscular Volume) 114 fL          80-98           



                (test code = 787-2)                                 

 

                MCH (Mean Corpuscular 38.3 pg         26.5-34         



                Hemoglobin) (test code =                                 



                785-6)                                          

 

                MCHC (Mean Corpuscular 33.7 %          31.4-36         



                Hemoglobin Concentration)                                 



                (test code = 786-4)                                 

 

                    RBC (Red Blood Cell Count) 2.64                3.77 - 5.16 x

10

1                                       



                (test code = 01630-2)                 2/L             

 

                RDW-CV (Red Cell Distribution 13.8 %          11.5-14.5       



                Width) (test code = 44946-0)                                 

 

                    NRBC (Nucleated Red Blood 0.03                0 x10

9                                       



                Cell Count) (test code =                 /L              



                97808-8)                                        

 

                NRBC % (Nucleated Red Blood 1.2 %                           



                Cell %) (test code = 48764-9)                                 

 

                MPV (Mean Platelet Volume) 9.8 fL          7.2-11.7        



                (test code = 71775466)                                 

 

                Slide Review/Morphology (test Yes                             Bl

ood film reviewed,



                code = [id number])                                 instrument coun

ts



                                                                confirmed,Macroc

ytes,Ani



                                                                socytosis,

 

                Lab Interpretation (test code Abnormal                        



                = 98532-8)                                      



[id number] 11:48:32





                Test Item       Value           Reference Range Comments

 

                Creatinine (test code = Creatinine) 0.7000 mg/dL    0.5000-1.500

0   

 

                Cr Clearance (Est) (test code = Cr Clearance 118.0600        75.

0000-115.0000 



                (Est))                                          



[id number] 09:04:00





                Test Item       Value           Reference Range Comments

 

                WBC (test code = WBC) 5.3000          3.5000-10.0000  

 

                RBC (test code = RBC) 2.6900          3.8000-6.1000   

 

                HGB (test code = HGB) 10.0000 g/dL    11.0000-16.0000 

 

                HCT (test code = HCT) 29.1000 %       35.0000-46.0000 

 

                MCV (test code = MCV) 108.4000 fL     80.0000-100.0000 

 

                MCH (test code = MCH) 37.4000 pg      27.0000-32.0000 

 

                MCHC (test code = MCHC) 34.4000 g/dL    31.0000-35.0000 

 

                RDW (test code = RDW) 14.1000         10.0000-15.0000 

 

                PLT (test code = PLT) 208.0000        140.0000-440.0000 

 

                MPV (test code = MPV) 6.8000 fL       6.5000-11.0000  

 

                Lymphocytes % (test code = Lymphocytes %) 24.4000 %       17.000

0-48.0000 

 

                MID% (test code = MID%) 6.7000 %        4.0000-10.0000  

 

                Neutrophils % (test code = Neutrophils %) 68.9000 %       43.000

0-77.0000 

 

                Lymphocytes (test code = Lymphocytes) 1.3000          1.2000-3.2

000   

 

                MID (test code = MID) 0.4000          0.3000-0.8000   

 

                Neutrophils (test code = Neutrophils) 3.6000          1.2000-7.0

000   



[id number] 09:04:00





                Test Item       Value           Reference Range Comments

 

                Creatinine (test code = Creatinine) 0.7000 mg/dL    0.5000-1.000

0   

 

                Cr Clearance (Est) (test code = Cr 118.0600        75.0000-115.0

000 



                Clearance (Est))                                 

 

                Glucose (test code = Glucose) 87.0000 mg/dL   73.0000-118.0000 

 

                BUN (test code = BUN) 14.0000 mg/dL   5.0000-26.0000  

 

                Sodium (test code = Sodium) 142.0000 mmol/L 128.0000-145.0000 

 

                Potassium (test code = Potassium) 3.5000 mmol/L   3.5000-5.2000 

  

 

                Chloride (test code = Chloride) 103.0000 mmol/L 97.0000-108.0000

 

 

                CO2 (test code = CO2) 29.0000 mmol/L  20.0000-32.0000 

 

                Albumin (test code = Albumin) 3.6000 g/dL     3.6000-4.8000   

 

                Alkaline Phosphatase (test code = Alkaline 55.0000         25.00

.0000 



                Phosphatase)                                    

 

                ALT (SGPT) (test code = ALT (SGPT)) 30.0000         0.0000-40.00

00  

 

                AST (SGOT) (test code = AST (SGOT)) 25.0000         0.0000-40.00

00  

 

                Calcium (test code = Calcium) 10.6000 mg/dL   8.5000-10.6000  

 

                Bilirubin, Total (test code = Bilirubin, 0.7000 mg/dL    0.1000-

1.2000   



                Total)                                          

 

                Protein, Total (test code = Protein, 6.7000 g/dL     6.0000-8.50

00   



                Total)                                          

 

                eGFR -American (test code = eGFR 60.0000         60.0000-

170.0000 



                -American)                                 

 

                eGFR Non--American (test code = 60.0000         60.0000-1

70.0000 



                eGFR Non--American)                                 



[id number] 09:46:56





                Test Item       Value           Reference Range Comments

 

                Creatinine (test code = Creatinine) 0.7000 mg/dL    0.5000-1.500

0   

 

                Cr Clearance (Est) (test code = Cr Clearance 118.5200        75.

0000-115.0000 



                (Est))                                          



[id number] 09:13:00





                Test Item       Value           Reference Range Comments

 

                WBC (test code = WBC) 7.1000          3.5000-10.0000  

 

                RBC (test code = RBC) 2.8200          3.8000-6.1000   

 

                HGB (test code = HGB) 10.1000 g/dL    11.0000-16.0000 

 

                HCT (test code = HCT) 30.2000 %       35.0000-46.0000 

 

                MCV (test code = MCV) 106.9000 fL     80.0000-100.0000 

 

                MCH (test code = MCH) 36.0000 pg      27.0000-32.0000 

 

                MCHC (test code = MCHC) 33.6000 g/dL    31.0000-35.0000 

 

                RDW (test code = RDW) 14.5000         10.0000-15.0000 

 

                PLT (test code = PLT) 130.0000        140.0000-440.0000 

 

                MPV (test code = MPV) 7.3000 fL       6.5000-11.0000  

 

                Lymphocytes % (test code = Lymphocytes %) 29.4000 %       17.000

0-48.0000 

 

                MID% (test code = MID%) 6.1000 %        4.0000-10.0000  

 

                Neutrophils % (test code = Neutrophils %) 64.5000 %       43.000

0-77.0000 

 

                Lymphocytes (test code = Lymphocytes) 2.1000          1.2000-3.2

000   

 

                MID (test code = MID) 0.4000          0.3000-0.8000   

 

                Neutrophils (test code = Neutrophils) 4.6000          1.2000-7.0

000   



[id number] 09:13:00





                Test Item       Value           Reference Range Comments

 

                Creatinine (test code = Creatinine) 0.7000 mg/dL    0.5000-1.000

0   

 

                Cr Clearance (Est) (test code = Cr 118.5200        75.0000-115.0

000 



                Clearance (Est))                                 

 

                Glucose (test code = Glucose) 88.0000 mg/dL   73.0000-118.0000 

 

                BUN (test code = BUN) 11.0000 mg/dL   5.0000-26.0000  

 

                Sodium (test code = Sodium) 139.0000 mmol/L 128.0000-145.0000 

 

                Potassium (test code = Potassium) 4.0000 mmol/L   3.5000-5.2000 

  

 

                Chloride (test code = Chloride) 107.0000 mmol/L 97.0000-108.0000

 

 

                CO2 (test code = CO2) 32.0000 mmol/L  20.0000-32.0000 

 

                Albumin (test code = Albumin) 3.5000 g/dL     3.6000-4.8000   

 

                Alkaline Phosphatase (test code = Alkaline 51.0000         25.00

.0000 



                Phosphatase)                                    

 

                ALT (SGPT) (test code = ALT (SGPT)) 34.0000         0.0000-40.00

00  

 

                AST (SGOT) (test code = AST (SGOT)) 31.0000         0.0000-40.00

00  

 

                Calcium (test code = Calcium) 9.5000 mg/dL    8.5000-10.6000  

 

                Bilirubin, Total (test code = Bilirubin, 0.8000 mg/dL    0.1000-

1.2000   



                Total)                                          

 

                Protein, Total (test code = Protein, 6.6000 g/dL     6.0000-8.50

00   



                Total)                                          

 

                eGFR -American (test code = eGFR 60.0000         60.0000-

170.0000 



                -American)                                 

 

                eGFR Non--American (test code = 60.0000         60.0000-1

70.0000 



                eGFR Non--American)                                 



[id number] 10:01:08





                Test Item       Value           Reference Range Comments

 

                Creatinine (test code = Creatinine) 0.9000 mg/dL    0.5000-1.500

0   

 

                Cr Clearance (Est) (test code = Cr Clearance 91.6400         75.

0000-115.0000 



                (Est))                                          



[id number] 09:20:00





                Test Item       Value           Reference Range Comments

 

                WBC (test code = WBC) 7.9000          3.5000-10.0000  

 

                RBC (test code = RBC) 2.9200          3.8000-6.1000   

 

                HGB (test code = HGB) 10.4000 g/dL    11.0000-16.0000 

 

                HCT (test code = HCT) 30.4000 %       35.0000-46.0000 

 

                MCV (test code = MCV) 104.1000 fL     80.0000-100.0000 

 

                MCH (test code = MCH) 35.7000 pg      27.0000-32.0000 

 

                MCHC (test code = MCHC) 34.2000 g/dL    31.0000-35.0000 

 

                RDW (test code = RDW) 14.2000         10.0000-15.0000 

 

                PLT (test code = PLT) 218.0000        140.0000-440.0000 

 

                MPV (test code = MPV) 7.5000 fL       6.5000-11.0000  

 

                Lymphocytes % (test code = Lymphocytes %) 26.7000 %       17.000

0-48.0000 

 

                MID% (test code = MID%) 5.7000 %        4.0000-10.0000  

 

                Neutrophils % (test code = Neutrophils %) 67.6000 %       43.000

0-77.0000 

 

                Lymphocytes (test code = Lymphocytes) 2.1000          1.2000-3.2

000   

 

                MID (test code = MID) 0.4000          0.3000-0.8000   

 

                Neutrophils (test code = Neutrophils) 5.4000          1.2000-7.0

000   



[id number] 09:20:00





                Test Item       Value           Reference Range Comments

 

                Creatinine (test code = Creatinine) 0.9000 mg/dL    0.5000-1.000

0   

 

                Cr Clearance (Est) (test code = Cr 91.6400         75.0000-115.0

000 



                Clearance (Est))                                 

 

                Glucose (test code = Glucose) 81.0000 mg/dL   73.0000-118.0000 

 

                BUN (test code = BUN) 13.0000 mg/dL   5.0000-26.0000  

 

                Sodium (test code = Sodium) 138.0000 mmol/L 128.0000-145.0000 

 

                Potassium (test code = Potassium) 4.1000 mmol/L   3.5000-5.2000 

  

 

                Chloride (test code = Chloride) 106.0000 mmol/L 97.0000-108.0000

 

 

                CO2 (test code = CO2) 29.0000 mmol/L  20.0000-32.0000 

 

                Albumin (test code = Albumin) 3.5000 g/dL     3.6000-4.8000   

 

                Alkaline Phosphatase (test code = Alkaline 62.0000         25.00

.0000 



                Phosphatase)                                    

 

                ALT (SGPT) (test code = ALT (SGPT)) 26.0000         0.0000-40.00

00  

 

                AST (SGOT) (test code = AST (SGOT)) 28.0000         0.0000-40.00

00  

 

                Calcium (test code = Calcium) 9.5000 mg/dL    8.5000-10.6000  

 

                Bilirubin, Total (test code = Bilirubin, 0.9000 mg/dL    0.1000-

1.2000   



                Total)                                          

 

                Protein, Total (test code = Protein, 6.7000 g/dL     6.0000-8.50

00   



                Total)                                          

 

                eGFR -American (test code = eGFR 60.0000         60.0000-

170.0000 



                -American)                                 

 

                eGFR Non--American (test code = 60.0000         60.0000-1

70.0000 



                eGFR Non--American)                                 



[id number] 09:02:00





                Test Item       Value           Reference Range Comments

 

                Creatinine (test code = Creatinine) 1.0000 mg/dL    0.5000-1.000

0   

 

                Cr Clearance (Est) (test code = Cr 82.1500         75.0000-115.0

000 



                Clearance (Est))                                 

 

                Glucose (test code = Glucose) 98.0000 mg/dL   73.0000-118.0000 

 

                BUN (test code = BUN) 8.0000 mg/dL    5.0000-26.0000  

 

                Sodium (test code = Sodium) 139.5000 mmol/L 128.0000-145.0000 

 

                Potassium (test code = Potassium) 3.8000 mmol/L   3.5000-5.2000 

  

 

                Chloride (test code = Chloride) 105.1000 mmol/L 97.0000-108.0000

 

 

                CO2 (test code = CO2) 28.0000 mmol/L  20.0000-32.0000 

 

                Albumin (test code = Albumin) 3.9000 g/dL     3.6000-4.8000   

 

                Alkaline Phosphatase (test code = Alkaline 48.0000         25.00

.0000 



                Phosphatase)                                    

 

                ALT (SGPT) (test code = ALT (SGPT)) 25.0000         0.0000-40.00

00  

 

                AST (SGOT) (test code = AST (SGOT)) 18.0000         0.0000-40.00

00  

 

                Calcium (test code = Calcium) 8.7200 mg/dL    8.5000-10.6000  

 

                Bilirubin, Total (test code = Bilirubin, 0.5000 mg/dL    0.1000-

1.2000   



                Total)                                          

 

                Protein, Total (test code = Protein, 5.8000 g/dL     6.0000-8.50

00   



                Total)                                          

 

                eGFR -American (test code = eGFR 60.0000         60.0000-

170.0000 



                -American)                                 

 

                eGFR Non--American (test code = 60.0000         60.0000-1

70.0000 



                eGFR Non--American)                                 



[id number] 09:18:10





                Test Item       Value           Reference Range Comments

 

                Creatinine (test code = Creatinine) 0.7000 mg/dL    0.5000-1.500

0   

 

                Cr Clearance (Est) (test code = Cr Clearance 117.3600        75.

0000-115.0000 



                (Est))                                          



[id number] 08:49:00





                Test Item       Value           Reference Range Comments

 

                WBC (test code = WBC) 5.1000          3.5000-10.0000  

 

                RBC (test code = RBC) 2.9800          3.8000-6.1000   

 

                HGB (test code = HGB) 10.5000 g/dL    11.0000-16.0000 

 

                HCT (test code = HCT) 30.3000 %       35.0000-46.0000 

 

                MCV (test code = MCV) 101.5000 fL     80.0000-100.0000 

 

                MCH (test code = MCH) 35.4000 pg      27.0000-32.0000 

 

                MCHC (test code = MCHC) 34.9000 g/dL    31.0000-35.0000 

 

                RDW (test code = RDW) 12.7000         10.0000-15.0000 

 

                PLT (test code = PLT) 170.0000        140.0000-440.0000 

 

                MPV (test code = MPV) 6.8000 fL       6.5000-11.0000  

 

                Lymphocytes % (test code = Lymphocytes %) 40.0000 %       17.000

0-48.0000 

 

                MID% (test code = MID%) 6.4000 %        4.0000-10.0000  

 

                Neutrophils % (test code = Neutrophils %) 53.6000 %       43.000

0-77.0000 

 

                Lymphocytes (test code = Lymphocytes) 2.0000          1.2000-3.2

000   

 

                MID (test code = MID) 0.4000          0.3000-0.8000   

 

                Neutrophils (test code = Neutrophils) 2.7000          1.2000-7.0

000   



[id number] 08:48:00





                Test Item       Value           Reference Range Comments

 

                Creatinine (test code = Creatinine) 0.7000 mg/dL    0.5000-1.000

0   

 

                Cr Clearance (Est) (test code = Cr 117.3600        75.0000-115.0

000 



                Clearance (Est))                                 

 

                Glucose (test code = Glucose) 114.0000 mg/dL  73.0000-118.0000 

 

                BUN (test code = BUN) 12.0000 mg/dL   5.0000-26.0000  

 

                Sodium (test code = Sodium) 142.0000 mmol/L 128.0000-145.0000 

 

                Potassium (test code = Potassium) 3.1000 mmol/L   3.5000-5.2000 

  

 

                Chloride (test code = Chloride) 105.0000 mmol/L 97.0000-108.0000

 

 

                CO2 (test code = CO2) 30.0000 mmol/L  20.0000-32.0000 

 

                Albumin (test code = Albumin) 3.2000 g/dL     3.6000-4.8000   

 

                Alkaline Phosphatase (test code = Alkaline 47.0000         25.00

.0000 



                Phosphatase)                                    

 

                ALT (SGPT) (test code = ALT (SGPT)) 22.0000         0.0000-40.00

00  

 

                AST (SGOT) (test code = AST (SGOT)) 22.0000         0.0000-40.00

00  

 

                Calcium (test code = Calcium) 9.3000 mg/dL    8.5000-10.6000  

 

                Bilirubin, Total (test code = Bilirubin, 0.9000 mg/dL    0.1000-

1.2000   



                Total)                                          

 

                Protein, Total (test code = Protein, 6.0000 g/dL     6.0000-8.50

00   



                Total)                                          

 

                eGFR -American (test code = eGFR 60.0000         60.0000-

170.0000 



                -American)                                 

 

                eGFR Non--American (test code = 60.0000         60.0000-1

70.0000 



                eGFR Non--American)                                 



[id number] 10:39:27





                Test Item       Value           Reference Range Comments

 

                Creatinine (test code = Creatinine) 0.6000 mg/dL    0.5000-1.500

0   

 

                Cr Clearance (Est) (test code = Cr Clearance 135.4600        75.

0000-115.0000 



                (Est))                                          



[id number] 09:19:00





                Test Item       Value           Reference Range Comments

 

                WBC (test code = WBC) 10.7000         3.5000-10.0000  

 

                RBC (test code = RBC) 3.5300          3.8000-6.1000   

 

                HGB (test code = HGB) 12.2000 g/dL    11.0000-16.0000 

 

                HCT (test code = HCT) 36.0000 %       35.0000-46.0000 

 

                MCV (test code = MCV) 101.9000 fL     80.0000-100.0000 

 

                MCH (test code = MCH) 34.5000 pg      27.0000-32.0000 

 

                MCHC (test code = MCHC) 33.8000 g/dL    31.0000-35.0000 

 

                RDW (test code = RDW) 12.5000         10.0000-15.0000 

 

                PLT (test code = PLT) 175.0000        140.0000-440.0000 

 

                MPV (test code = MPV) 8.1000 fL       6.5000-11.0000  

 

                Lymphocytes % (test code = Lymphocytes %) 19.7000 %       17.000

0-48.0000 

 

                MID% (test code = MID%) 5.4000 %        4.0000-10.0000  

 

                Neutrophils % (test code = Neutrophils %) 74.9000 %       43.000

0-77.0000 

 

                Lymphocytes (test code = Lymphocytes) 2.1000          1.2000-3.2

000   

 

                MID (test code = MID) 0.6000          0.3000-0.8000   

 

                Neutrophils (test code = Neutrophils) 8.0000          1.2000-7.0

000   



[id number] 09:19:00





                Test Item       Value           Reference Range Comments

 

                Creatinine (test code = Creatinine) 0.6000 mg/dL    0.5000-1.000

0   

 

                Cr Clearance (Est) (test code = Cr 135.4600        75.0000-115.0

000 



                Clearance (Est))                                 

 

                Glucose (test code = Glucose) 137.0000 mg/dL  73.0000-118.0000 

 

                BUN (test code = BUN) 11.0000 mg/dL   5.0000-26.0000  

 

                Sodium (test code = Sodium) 138.0000 mmol/L 128.0000-145.0000 

 

                Potassium (test code = Potassium) 3.4000 mmol/L   3.5000-5.2000 

  

 

                Chloride (test code = Chloride) 110.0000 mmol/L 97.0000-108.0000

 

 

                CO2 (test code = CO2) 27.0000 mmol/L  20.0000-32.0000 

 

                Albumin (test code = Albumin) 3.4000 g/dL     3.6000-4.8000   

 

                Alkaline Phosphatase (test code = Alkaline 43.0000         25.00

.0000 



                Phosphatase)                                    

 

                ALT (SGPT) (test code = ALT (SGPT)) 25.0000         0.0000-40.00

00  

 

                AST (SGOT) (test code = AST (SGOT)) 23.0000         0.0000-40.00

00  

 

                Calcium (test code = Calcium) 9.1000 mg/dL    8.5000-10.6000  

 

                Bilirubin, Total (test code = Bilirubin, 0.8000 mg/dL    0.1000-

1.2000   



                Total)                                          

 

                Protein, Total (test code = Protein, 6.4000 g/dL     6.0000-8.50

00   



                Total)                                          

 

                eGFR -American (test code = eGFR 60.0000         60.0000-

170.0000 



                -American)                                 

 

                eGFR Non--American (test code = 60.0000         60.0000-1

70.0000 



                eGFR Non--American)                                 



[id number] 09:14:17





                Test Item       Value           Reference Range Comments

 

                Creatinine (test code = Creatinine) 0.8000 mg/dL    0.5000-1.500

0   

 

                Cr Clearance (Est) (test code = Cr Clearance 101.6000        75.

0000-115.0000 



                (Est))                                          



[id number] 08:45:00





                Test Item       Value           Reference Range Comments

 

                WBC (test code = WBC) 4.8000          3.5000-10.0000  

 

                RBC (test code = RBC) 3.4200          3.8000-6.1000   

 

                HGB (test code = HGB) 12.1000 g/dL    11.0000-16.0000 

 

                HCT (test code = HCT) 34.9000 %       35.0000-46.0000 

 

                MCV (test code = MCV) 101.8000 fL     80.0000-100.0000 

 

                MCH (test code = MCH) 35.4000 pg      27.0000-32.0000 

 

                MCHC (test code = MCHC) 34.8000 g/dL    31.0000-35.0000 

 

                RDW (test code = RDW) 12.2000         10.0000-15.0000 

 

                PLT (test code = PLT) 272.0000        140.0000-440.0000 

 

                MPV (test code = MPV) 7.9000 fL       6.5000-11.0000  

 

                Lymphocytes % (test code = Lymphocytes %) 31.1000 %       17.000

0-48.0000 

 

                MID% (test code = MID%) 7.4000 %        4.0000-10.0000  

 

                Neutrophils % (test code = Neutrophils %) 61.5000 %       43.000

0-77.0000 

 

                Lymphocytes (test code = Lymphocytes) 1.5000          1.2000-3.2

000   

 

                MID (test code = MID) 0.4000          0.3000-0.8000   

 

                Neutrophils (test code = Neutrophils) 2.9000          1.2000-7.0

000   



[id number] 08:45:00





                Test Item       Value           Reference Range Comments

 

                Creatinine (test code = Creatinine) 0.8000 mg/dL    0.5000-1.000

0   

 

                Cr Clearance (Est) (test code = Cr 101.6000        75.0000-115.0

000 



                Clearance (Est))                                 

 

                Glucose (test code = Glucose) 121.0000 mg/dL  73.0000-118.0000 

 

                BUN (test code = BUN) 10.0000 mg/dL   5.0000-26.0000  

 

                Sodium (test code = Sodium) 142.0000 mmol/L 128.0000-145.0000 

 

                Potassium (test code = Potassium) 3.6000 mmol/L   3.5000-5.2000 

  

 

                Chloride (test code = Chloride) 110.0000 mmol/L 97.0000-108.0000

 

 

                CO2 (test code = CO2) 30.0000 mmol/L  20.0000-32.0000 

 

                Albumin (test code = Albumin) 3.4000 g/dL     3.6000-4.8000   

 

                Alkaline Phosphatase (test code = Alkaline 45.0000         25.00

.0000 



                Phosphatase)                                    

 

                ALT (SGPT) (test code = ALT (SGPT)) 18.0000         0.0000-40.00

00  

 

                AST (SGOT) (test code = AST (SGOT)) 23.0000         0.0000-40.00

00  

 

                Calcium (test code = Calcium) 9.3000 mg/dL    8.5000-10.6000  

 

                Bilirubin, Total (test code = Bilirubin, 0.8000 mg/dL    0.1000-

1.2000   



                Total)                                          

 

                Protein, Total (test code = Protein, 6.4000 g/dL     6.0000-8.50

00   



                Total)                                          

 

                eGFR -American (test code = eGFR 60.0000         60.0000-

170.0000 



                -American)                                 

 

                eGFR Non--American (test code = 60.0000         60.0000-1

70.0000 



                eGFR Non--American)                                 







Assessments







                Condition Name  Status          Diagnosis Date  Treating Clinici

an

 

                Malignant neoplasm of upper-outer quadrant Unknown              

           



                of right breast in female, estrogen                             

    



                receptor positive (CMS-HCC)                                 

 

                S/P right mastectomy Unknown                         

 

                S/P right mastectomy Unknown                         

 

                Malignant neoplasm of upper-outer quadrant Unknown              

           



                of right breast in female, estrogen                             

    



                receptor positive (CMS-HCC)                                 

 

                S/P right mastectomy Unknown                         

 

                Malignant neoplasm of upper-outer quadrant Unknown              

           



                of right breast in female, estrogen                             

    



                receptor positive (CMS-HCC)                                 

 

                Gastroesophageal reflux disease without Unknown                 

        



                esophagitis                                     

 

                Anxiety         Unknown                         

 

                Malignant neoplasm of upper-outer quadrant Unknown              

           



                of right breast in female, estrogen                             

    



                receptor positive (CMS-HCC)                                 

 

                S/P right mastectomy Unknown                         

 

                Breast cancer screening, high risk patient Unknown              

           

 

                Personal history of breast cancer Unknown                       

  

 

                S/P right mastectomy Unknown                         

 

                Need for immunization against influenza Unknown                 

        

 

                S/P right mastectomy Unknown                         

 

                Malignant neoplasm of upper-outer quadrant Unknown              

           



                of right breast in female, estrogen                             

    



                receptor positive (CMS-HCC)                                 

 

                Malignant neoplasm of upper-outer quadrant Unknown              

           



                of right breast in female, estrogen                             

    



                receptor positive (CMS-HCC)                                 

 

                Malignant neoplasm of upper-outer quadrant Unknown              

           



                of right breast in female, estrogen                             

    



                receptor positive (CMS-HCC)                                 

 

                S/P right mastectomy Unknown                         

 

                Malignant neoplasm of upper-outer quadrant Unknown              

           



                of right breast in female, estrogen                             

    



                receptor positive (CMS-HCC)                                 

 

                Gastroesophageal reflux disease without Unknown                 

        



                esophagitis                                     

 

                Anxiety         Unknown                         

 

                Screening for viral disease Unknown                         

 

                S/P right mastectomy Unknown                         

 

                Malignant neoplasm of upper-outer quadrant Unknown              

           



                of right breast in female, estrogen                             

    



                receptor positive (CMS-HCC)                                 







Encounters







        Start   End     Encounter Admission Attending Care    Care    Encounter



        Date/Time Date/Time Type    Type    Clinicians Facility Department ID

 

        2020 Outpatient                 [facility]

 2020



        00:00:00 00:00:00                         [facility] 



                                                Oncology Oncology 



                                                Center  [place]  

 

        2020 Outpatient                 [facility]

 2020



        00:00:00 00:00:00                         [facility] Medical 



                                                Oncology Oncology 



                                                Center  [place]  

 

        2020 Matt                  [facility] 



        00:00:00 00:00:00 Leesa STAFFORD rn Medical Medical 



                                                Oncology Oncology 



                                                Center  [place]  

 

        2020 Outpatient         [facility]

n [id number]



        00:00:00 00:00:00                 [facility] 



                                                On[facility] Oncology 



                                                Center  [place]  

 

        2020 Outpatient                 [facility]

 [id number]



        00:00:00 00:00:00                         [facility] 



                                                Oncology Oncology 



                                                Center  [place]  

 

        2020-10-30 2020-10-30 Outpatient         [facility]

n [id number]



        00:00:00 00:00:00                 [facility] 



                                                On[facility] Oncology 



                                                Center  [place]  

 

        2020-10-29 2020-10-29 Outpatient                 [facility]

 [id number]



        00:00:00 00:00:00                         [facility]n Medical Medical 



                                                Oncology Oncology 



                                                Center  [place]  

 

        2020-10-23 2020-10-23 [facility] 2

[id number]



        00:00:00 00:00:00 Dr. [facility] 



                                                Oncology Oncology 



                                                Center  [place]  

 

        2020-10-20 2020-10-20 Outpatient         [facility]

n [id number]



        00:00:00 00:00:00                 [facility] 



                                                Oncology Oncology 



                                                Center  [facility]  

 

        2020-10-16 2020-10-16 Outpatient         [facility]

n [id number]



        00:00:00 00:00:00                 [facility] 



                                                On[facility] Oncology 



                                                Center  [facility]  

 

        2020-10-15 2020-10-15 Outpatient         [facility]

n [id number]



        00:00:00 00:00:00                 [facility] 



                                                On[facility] Oncology 



                                                Center  [facility]  

 

        2020-10-09 2020-10-09 Outpatient         [facility]

n [id number]



        00:00:00 00:00:00                 [facility] 



                                                Oncology Oncology 



                                                Center  Center  

 

        2020-10-08 2020-10-08 Outpatient         [facility] Chnad[facility]

n [id number]



        00:00:00 00:00:00                 [facility] 



                                                Oncology Oncology 



                                                Center  [place]  

 

        2020-10-05 2020-10-05 Outpatient                 [facility]

 [id number]



        00:00:00 00:00:00                         rn Medical Medical 



                                                Oncology Oncology 



                                                Center  [place]  

 

        2020-10-02 2020-10-02 Ramesh [facility] 

[id number]



        00:00:00 00:00:00 Dr. Joni Quinones rn Medical Medical 



                                                Oncology Oncology 



                                                Center  [place]  

 

        2020 Outpatient         [facility]

n [id number]



        00:00:00 00:00:00                 [facility] 



                                                Oncology Oncology 



                                                Center  [place]  

 

        2020 Outpatient                 [facility]

 [id number]



        00:00:00 00:00:00                         [facility] 



                                                Oncology Oncology 



                                                Center  [place]  

 

        2020 Outpatient         ROCAEL NEVES    [facility]    [id number]

[id number]



        10:49:03 10:49:03                 PRISCILLA                  

 

        2020 Outpatient         [facility]

n [id number]



        00:00:00 00:00:00                 [facility] 



                                                Oncology Oncology 



                                                Center  [place]  

 

        2020 Outpatient                 [facility]

 [id number]



        00:00:00 00:00:00                         [facility] 



                                                Oncology Oncology 



                                                Center  [place]  

 

        2020 Outpatient         ROCAEL BENITES    [facility]    [id number]

[id number]



        09:01:24 09:01:24                 MARILYNN                   

 

        2020-09-15 2020-09-15 Outpatient                 [facility]

 [id number]



        00:00:00 00:00:00                         rn Medical Medical 



                                                Oncology Oncology 



                                                Center  [place]  

 

        2020 Outpatient                 [facility]

 2020



        00:00:00 00:00:00                         rn Medical Medical 



                                                Oncology Oncology 



                                                Center  [place]  

 

        2020 [facility] 2

[id number]



        00:00:00 00:00:00 Dr. [facility]i [facility] 



                                                Oncology Oncology 



                                                Center  [place]  

 

        2020 Outpatient                 [facility]

 2020



        00:00:00 00:00:00                         [facility] Medical Medical 



                                                Oncology Oncology 



                                                Center  [place]  

 

        2020 Outpatient                 [facility]

 2020



        00:00:00 00:00:00                         [facility]n Medical Medical 



                                                Oncology Oncology 



                                                Center  [place]  

 

        2020 Outpatient                 [facility]    [id number]

[id number]



        00:00:00 00:00:00                                         

 

        2020 [facility]    [id number]

[id number]



        15:35:32 15:35:32                 PRISCILLA                  

 

        2020 Outpatient         [facility]

n [id number]



        00:00:00 00:00:00                 [facility] 



                                                Oncology Oncology 



                                                Center  [place]  

 

        2020 Outpatient                 [facility]

 [id number]



        00:00:00 00:00:00                         [facility] Medical 



                                                Oncology Oncology 



                                                Center  [place]  

 

        2020 [facility]    [id number]

[id number]



        12:40:51 12:40:51                 JOHANNA                 

 

        2020 [facility]    [id number]

[id number]



        11:49:52 11:49:52                 MARILYNN                   

 

        2020 Outpatient                 [facility]

 [id number]



        00:00:00 00:00:00                         [facility] Medical 



                                                Oncology Oncology 



                                                Center  [place]  

 

        2020 Outpatient         [facility]

n [id number]



        00:00:00 00:00:00                 [facility] 



                                                Oncology Oncology 



                                                Center  [place]  

 

        2020 Outpatient                 [facility]    [id number]

[id number]



        00:00:00 00:00:00                                         

 

        2020 Outpatient                 [facility]

 2020



        00:00:00 00:00:00                         [facility] Medical Medical 



                                                Oncology Oncology 



                                                Center  [place]  

 

        2020 [facility]                 [facility]    [id number]

[id number]



        00:00:00 00:00:00                                         

 

        2020 Outpatient                 [facility]    [facility]    [id number]

[id number]



        00:00:00 00:00:00                                         

 

        2020 Outpatient                 [facility]

 2020



        00:00:00 00:00:00                         [facility]n Medical Medical 



                                                Oncology Oncology 



                                                Center  [place]  

 

        2020 Outpatient         PURA SANDORMICKEY    [facility]    [id number]

[id number]



        14:13:45 14:13:45                 PRISCILLA                  

 

        2020 Outpatient                 [facility]

 2020



        00:00:00 00:00:00                         rn Medical Medical 



                                                Oncology Oncology 



                                                Center  [place]  

 

        [facility]



        [facility] 00[facility]00 Erika                  [facility] 



                                                Oncology Oncology 



                                                Center  [place]  

 

        2020-08-15 2020-08-15 Outpatient                 [facility]    [id number]

[id number]



        00:00:00 00:00:00                                         

 

        2020 Outpatient         ROCAEL BENITES    [facility]    [id number]

[id number]



        11:56:42 11:56:42                 MARILYNN                   

 

        2020 Outpatient         ROCAEL PADGETT    [facility]    [id number]

[id number]



        09:42:05 09:50:00                 JOHANNA                 

 

        2020 Outpatient                 [facility]

 2020



        00:00:00 00:00:00                         [facility] Medical Medical 



                                                Oncology Oncology 



                                                Center  [place]  

 

        2020 [facility]                 [facility]    [id number]

[id number]



        11:15:27 11:15:27                                         

 

        2020 Outpatient         ROCAEL BENITES    [id number]

[id number]



        08:22:17 08:22:17                 MARILYNN                   

 

        2020 Outpatient                 [facility]

 2020



        00:00:00 00:00:00                         [facility] Medical Medical 



                                                Oncology Oncology 



                                                Center  [place]  

 

        2020-08-10 2020-08-10 Outpatient                 [facility]    [facility]    [id number]

[id number]



        10:30:11 10:30:11                                         

 

        2020-08-10 2020-08-10 Outpatient         ROCAEL BENITES    [facility]    [id number]

[id number]



        09:17:11 09:17:11                 MARILYNN                   

 

        2020-08-10 2020-08-10 Outpatient                 [facility]    [facility]    [id number]

[id number]



        08:50:54 08:50:54                                         

 

        2020 Outpatient         [facility]

n [id number]



        00:00:00 00:00:00                 [facility] 



                                                Oncology Oncology 



                                                Center  [place]  

 

        2020 Outpatient                 [facility]

 2020



        00:00:00 00:00:00                         [facility] 



                                                Oncology Oncology 



                                                Center  [place]  

 

        2020 Outpatient                 [facility]    [facility]    [id number]

[id number]



        00:00:00 00:00:00                                         

 

        2020 Outpatient                 [facility]    [facility]    [id number]

[id number]



        10:02:31 11:18:17                                         

 

        2020 Outpatient                 [facility]

 2020



        00:00:00 00:00:00                         [facility]n Medical Medical 



                                                Oncology Oncology 



                                                Center  [place]  

 

        2020 Outpatient                 [facility]

 2020



        00:00:00 00:00:00                         rn Medical Medical 



                                                Oncology Oncology 



                                                Center  [place]  

 

        2020 Outpatient                 [facility]

 2020



        00:00:00 00:00:00                         rn Medical Medical 



                                                Oncology Oncology 



                                                Center  [place]  

 

        2020 Dinh         Homero [facility] 

2020



        00:00:00 00:00:00 Sergio Quinones rn Medical Medical 



                                                Oncology Oncology 



                                                Center  [place]  

 

        2020 SiriHomero [facility] 2007



        00:00:00 00:00:00 Meggan SHAH rn Medical Medical 



                                                Oncology Oncology 



                                                Center  [place]  

 

        2020-07-10 2020-07-10 Outpatient         JULYROCAEL WINSTON    [facility]    [id number]

[id number]



        10:10:14 10:10:14                 JOHANNA                 

 

        2020 Outpatient                 [facility]

 2020



        00:00:00 00:00:00                         rn Medical Medical 



                                                Oncology Oncology 



                                                Center  [place]  

 

        2020 Outpatient                 Homero [facility]

 2020



        00:00:00 00:00:00                         rn Medical Medical 



                                                Oncology Oncology 



                                                Center  [place]  

 

        2020 Mae Vernon                 [facility]

 2020



        00:00:00 00:00:00                         rn Medical Medical 



                                                Oncology Oncology 



                                                Center  Center  

 

        2020 Outpatient                 [facility]

 2020



        00:00:00 00:00:00                         rn Medical Medical 



                                                Oncology Oncology 



                                                Center  Center  

 

        2020 Outpatient         ROCAEL BENITES    [facility]    [id number]

[id number]



        10:54:15 10:54:15                 MARILYNN                   

 

        2020 Saulo Mae                 [facility]

 [id number]



        00:00:00 00:00:00                         rn Medical Medical 



                                                Oncology Oncology 



                                                Center  Center  

 

        2020 Outpatient                 Homero [facility]

 2020



        00:00:00 00:00:00                         rn Medical Medical 



                                                Oncology Oncology 



                                                Center  Center  

 

        2020 Outpatient         Homero Chandler

n [id number]



        00:00:00 00:00:00                 [facility] 



                                                Oncology Oncology 



                                                Center  Center  

 

        2020 Outpatient                 Homero Meyer

 [id number]



        00:00:00 00:00:00                         rn Medical Medical 



                                                Oncology Oncology 



                                                Center  Center  

 

        2020 Outpatient         Homero Chandler

n [id number]



        00:00:00 00:00:00                 [facility] 



                                                Oncology Oncology 



                                                Center  Center  

 

        2020 Saulo Mae Valentin Betsy

 [id number]



        00:00:00 00:00:00                         rn Medical Medical 



                                                Oncology Oncology 



                                                Center  Center  

 

        2020 Outpatient                 Homero Meyer

 [id number]



        00:00:00 00:00:00                         rn Medical Medical 



                                                Oncology Oncology 



                                                Center  Center  

 

        2020 Outpatient                 Homero Meyer

 [id number]



        00:00:00 00:00:00                         rn Medical Medical 



                                                Oncology Oncology 



                                                Center  Center  

 

        2020 Outpatient                 Homero Meyer

 [id number]



        00:00:00 00:00:00                         rn Medical Medical 



                                                Oncology Oncology 



                                                Center  Center  

 

        2020 Saulo Mae                 [facility] Betsy

 [id number]



        00:00:00 00:00:00                         rn Medical Medical 



                                                Oncology Oncology 



                                                Center  Center  

 

        2020 Outpatient                 Homero Meyer

 [id number]



        00:00:00 00:00:00                         rn Medical Medical 



                                                Oncology Oncology 



                                                Center  Center  

 

        2020 Mae VernonHomero poeer

n [id number]



        00:00:00 00:00:00                 [facility] 



                                                Oncology Oncology 



                                                Center  Center  

 

        2020 Outpatient                 [facility]

 [id number]



        00:00:00 00:00:00                         rn Medical Medical 



                                                Oncology Oncology 



                                                Center  Center  

 

        2020 Outpatient                 [facility]

 2020



        00:00:00 00:00:00                         rn Medical Medical 



                                                Oncology Oncology 



                                                Center  Center  

 

        2020 Outpatient                 [facility]

 2020



        00:00:00 00:00:00                         rn Medical Medical 



                                                Oncology Oncology 



                                                Center  Center  

 

        2020 Outpatient         Homero Chandlerer

n [id number]



        00:00:00 00:00:00                 [facility] 



                                                Oncology Oncology 



                                                Center  Center  

 

        2020 Outpatient                 [facility]

 2020



        00:00:00 00:00:00                         rn Medical Medical 



                                                Oncology Oncology 



                                                Center  Center  

 

        2020 Outpatient                 [facility]

 2020



        00:00:00 00:00:00                         rn Medical Medical 



                                                Oncology Oncology 



                                                Center  Center  

 

        2020-03-10 2020-03-10 Outpatient         Homero [facility]

n [id number]



        00:00:00 00:00:00                 [facility] Medical 



                                                Oncology Oncology 



                                                Center  Center  

 

        2020 Outpatient         Homero [facility]

n [id number]



        00:00:00 00:00:00                 [facility] Medical 



                                                Oncology Oncology 



                                                Center  Center  

 

        2020 Outpatient                 [facility]

 [id number]



        00:00:00 00:00:00                         rn Medical Medical 



                                                Oncology Oncology 



                                                Center  Center  

 

        2020 Ramesh [facility] 

[id number]



        00:00:00 00:00:00 Dr. Joni Quinones rn Medical Medical 



                                                Oncology Oncology 



                                                Center  Center  

 

        2020 Outpatient                 [facility]

 [id number]



        00:00:00 00:00:00                         rn Medical Medical 



                                                Oncology Oncology 



                                                Center  Center  

 

        2020 Outpatient                 [facility]

 [id number]



        00:00:00 00:00:00                         rn Medical Medical 



                                                Oncology Oncology 



                                                Center  Center  

 

        2020 Outpatient                 [facility]

 [id number]



        00:00:00 00:00:00                         rn Medical Medical 



                                                Oncology Oncology 



                                                Center  Center  

 

        2020 Outpatient                 [facility]    [place]    [id number]

[id number]



        08:00:00 23:59:00                                         

 

        2020 Outpatient                 [facility]ero [facility]

 [id number]



        00:00:00 00:00:00                         rn Medical Medical 



                                                Oncology Oncology 



                                                [facility]  

 

        2020 Outpatient                 Homero [facility]

 2020



        00:00:00 00:00:00                         rn [facility] Medical 



                                                Oncology Oncology 



                                                [facility]  

 

        2020 Outpatient                 [facility]

 2020



        00:00:00 00:00:00                         rn Medical Medical 



                                                Oncology Oncology 



                                                [facility]  

 

        2018 Outpatient                 [facility]  [id number]

[id number]



        10:53:48 11:28:57                                         

 

        2018 Outpatient                 [facility]  [id number]

[id number]



        00:00:00 00:00:00                                         

 

        2018 Outpatient                 [facility]  [id number]

[id number]



        00:00:00 00:00:00                                         

 

        2018-04-10 2018-04-10 Outpatient EL              [facility]  [facility]     [id number]

[id number]



        15:19:59 16:03:04                                         [id number]



                                                                [id number]

 

        2018 Outpatient EL              [facility]  [facility]     [id number]

[id number]



        00:00:00 00:00:00                                         [id number]







Payers







             Payer Name   Policy Type  Policy Number Effective Date Expiration D

ate

 

             [employer] BL[employer]                 [id number] 2018 00:00:00 



             OPTIONS/PPO/ADV                                        







Plan of Treatment







                Planned Activity Planned Date    Details         Comments

 

                Future Scheduled Test                  [code = ]      

 

                Future Scheduled Test                  [code = ]      

 

                Future Scheduled Test                  [code = ]      

 

                Future Scheduled Test                  [code = ]      

 

                Future Scheduled Test                  [code = ]      

 

                Future Scheduled Test                  [code = ]      

 

                Future Scheduled Test                  [code = ]      

 

                Future Scheduled Test                  [code = ]      

 

                Future Scheduled Test                  [code = ]      

 

                Future Scheduled Test                  [code = ]      

 

                Future Scheduled Test                  [code = ]      

 

                Future Scheduled Test                  [code = ]      

 

                Future Scheduled Test                  [code = ]      

 

                Future Scheduled Test                  [code = ]      

 

                Future Appointment 2021 15:00:00 Johanna Padgett MD, [address] [place] [id number] 

 

                Future Appointment 2021 14:00:00 Johanna Padgett MD, [address] [place] [address] 







Social History







                Social Habit    Start Date      Stop Date       Comments

 

                History SDOH Alcohol Std Drinks                                 

 

                History SDOH Alcohol Binge                                 

 

                Exposure to SARS-CoV-2 (event)                                 

 

                Alcohol intake  2020 00:00:00 2020 00:00:00 

 

                Tobacco use and exposure 2020 00:00:00 2020 00:00:00

 

 

                History SDOH Alcohol Frequency 2020 00:00:00 2020 00

:00:00 

 

                History SDOH Education 2020 00:00:00 2020 00:00:00 

 

                Tobacco smoking status NHIS 2018 00:00:00 2018 00:00

:00 









                    Smoking Status      Start Date          Stop Date

 

                    Never                                   2020 00:00:00









                          Social History Observation Description

 

                          Birth Sex                 Female







Vital Signs







                Vital Name      Observation Time Observation Value Comments

 

                Systolic blood pressure 2020 10:56:00 106 mm[Hg]      

 

                Diastolic blood pressure 2020 10:56:00 68 mm[Hg]       

 

                Heart rate      2020 10:56:00 90 /min         

 

                Body temperature 2020 10:56:00 36.78 Leila       

 

                Respiratory rate 2020 10:56:00 18 /min         

 

                Body height     2020 10:56:00 165.1 cm        

 

                Body weight     2020 10:56:00 73.5 kg         

 

                BMI             2020 10:56:00 26.96 kg/m2     

 

                Oxygen saturation in Arterial blood by 2020 10:56:00 98 % 

           



                Pulse oximetry                                  

 

                Systolic blood pressure 2020 09:38:00 118 mm[Hg]      

 

                Diastolic blood pressure 2020 09:38:00 74 mm[Hg]       

 

                Body temperature 2020 09:38:00 36.72 Leila       

 

                Respiratory rate 2020 09:38:00 18 /min         

 

                Body weight     2020 09:38:00 71.4 kg         

 

                BMI             2020 09:38:00 26.19 kg/m2     

 

                Oxygen saturation in Arterial blood by 2020 09:38:00 98 % 

           



                Pulse oximetry                                  

 

                Systolic blood pressure 2020 15:48:00 135 mm[Hg]      

 

                Diastolic blood pressure 2020 15:48:00 85 mm[Hg]       

 

                Heart rate      2020 15:48:00 101 /min        

 

                Body temperature 2020 15:48:00 37.61 Leila       

 

                Body height     2020 15:48:00 165.1 cm        

 

                Body weight     2020 15:48:00 70.308 kg       

 

                BMI             2020 15:48:00 25.79 kg/m2     

 

                Systolic blood pressure 2020 12:48:00 138 mm[Hg]      

 

                Diastolic blood pressure 2020 12:48:00 81 mm[Hg]       

 

                Heart rate      2020 12:48:00 80 /min         

 

                Body temperature 2020 12:48:00 37.22 Leila       

 

                Respiratory rate 2020 12:48:00 18 /min         

 

                Body height     2020 12:48:00 167.6 cm        

 

                Body weight     2020 12:48:00 70.6 kg         

 

                BMI             2020 12:48:00 25.13 kg/m2     

 

                Oxygen saturation in Arterial blood by 2020 12:48:00 97 % 

           



                Pulse oximetry                                  

 

                Systolic blood pressure 2020 12:01:00 132 mm[Hg]      

 

                Diastolic blood pressure 2020 12:01:00 85 mm[Hg]       

 

                Heart rate      2020 12:01:00 93 /min         

 

                Body temperature 2020 12:01:00 36.72 Leila       

 

                Systolic blood pressure 2020 14:35:00 139 mm[Hg]      

 

                Diastolic blood pressure 2020 14:35:00 80 mm[Hg]       

 

                Heart rate      2020 14:35:00 101 /min        

 

                Body height     2020 14:35:00 167.6 cm        

 

                Body weight     2020 14:35:00 70.4 kg         

 

                BMI             2020 14:35:00 25.05 kg/m2     

 

                Systolic blood pressure 2020 08:27:00 119 mm[Hg]      

 

                Diastolic blood pressure 2020 08:27:00 83 mm[Hg]       

 

                Heart rate      2020 08:27:00 80 /min         

 

                Body temperature 2020 08:27:00 36.5 Leila        

 

                Body height     2020 08:27:00 167.6 cm        

 

                Body weight     2020 08:27:00 69.854 kg       

 

                BMI             2020 08:27:00 24.86 kg/m2     

 

                Systolic blood pressure 2020 10:23:00 149 mm[Hg]      

 

                Diastolic blood pressure 2020 10:23:00 87 mm[Hg]       

 

                Heart rate      2020 10:23:00 84 /min         

 

                Body temperature 2020 10:23:00 36.89 Leila       

 

                Respiratory rate 2020 10:23:00 12 /min         

 

                Body height     2020 10:23:00 167.6 cm        

 

                Body weight     2020 10:23:00 70.035 kg       

 

                BMI             2020 10:23:00 24.92 kg/m2     

 

                Oxygen saturation in Arterial blood by 2020 10:23:00 98 % 

           



                Pulse oximetry                                  

 

                BMI             2020 10:31:21 25.3100         

 

                BP espana         2020 10:31:21 87.0000 mm[Hg]  

 

                Bdy height      2020 10:31:21 66.0000 [in_i]  

 

                SaO2% BldA PulseOx 2020 10:31:21 98.0000 %       

 

                Heart rate      2020 10:31:21 84.0000 /min    

 

                Resp rate       2020 10:31:21 16.0000 /min    

 

                BP sys          2020 10:31:21 128.0000 mm[Hg] 

 

                Body temperature 2020 10:31:21 97.1000 [degF]  

 

                Weight          2020 10:31:21 156.8000 [lb_av] 

 

                Bdy height      2020 09:47:13 66.0000 [in_i]  

 

                Body temperature 2020 09:47:13 97.7000 [degF]  

 

                Bdy height      2020-10-30 09:37:01 66.0000 [in_i]  

 

                Body temperature 2020-10-30 09:37:01 98.1000 [degF]  

 

                BMI             2020-10-23 09:53:06 25.0200         

 

                BP espana         2020-10-23 09:53:06 86.0000 mm[Hg]  

 

                Bdy height      2020-10-23 09:53:06 66.0000 [in_i]  

 

                SaO2% BldA PulseOx 2020-10-23 09:53:06 99.0000 %       

 

                Heart rate      2020-10-23 09:53:06 89.0000 /min    

 

                Resp rate       2020-10-23 09:53:06 16.0000 /min    

 

                BP sys          2020-10-23 09:53:06 124.0000 mm[Hg] 

 

                Body temperature 2020-10-23 09:53:06 98.5000 [degF]  

 

                Weight          2020-10-23 09:53:06 155.0000 [lb_av] 

 

                BMI             2020-10-15 08:40:27 25.4400         

 

                BP espana         2020-10-15 08:40:27 80.0000 mm[Hg]  

 

                Bdy height      2020-10-15 08:40:27 66.0000 [in_i]  

 

                SaO2% BldA PulseOx 2020-10-15 08:40:27 99.0000 %       

 

                Heart rate      2020-10-15 08:40:27 87.0000 /min    

 

                Resp rate       2020-10-15 08:40:27 16.0000 /min    

 

                BP sys          2020-10-15 08:40:27 117.0000 mm[Hg] 

 

                Body temperature 2020-10-15 08:40:27 97.9000 [degF]  

 

                Weight          2020-10-15 08:40:27 157.6000 [lb_av] 

 

                BMI             2020-10-08 08:51:56 25.7000         

 

                BP espana         2020-10-08 08:51:56 79.0000 mm[Hg]  

 

                Bdy height      2020-10-08 08:51:56 66.0000 [in_i]  

 

                SaO2% BldA PulseOx 2020-10-08 08:51:56 98.0000 %       

 

                Heart rate      2020-10-08 08:51:56 89.0000 /min    

 

                Resp rate       2020-10-08 08:51:56 16.0000 /min    

 

                BP sys          2020-10-08 08:51:56 117.0000 mm[Hg] 

 

                Body temperature 2020-10-08 08:51:56 98.1000 [degF]  

 

                Weight          2020-10-08 08:51:56 159.2000 [lb_av] 

 

                BMI             2020-10-02 10:39:48 25.3400         

 

                BP espana         2020-10-02 10:39:48 80.0000 mm[Hg]  

 

                Bdy height      2020-10-02 10:39:48 66.0000 [in_i]  

 

                SaO2% BldA PulseOx 2020-10-02 10:39:48 98.0000 %       

 

                Heart rate      2020-10-02 10:39:48 80.0000 /min    

 

                Resp rate       2020-10-02 10:39:48 18.0000 /min    

 

                BP sys          2020-10-02 10:39:48 118.0000 mm[Hg] 

 

                Body temperature 2020-10-02 10:39:48 98.1000 [degF]  

 

                Weight          2020-10-02 10:39:48 157.0000 [lb_av] 

 

                Bdy height      2020 11:42:34 66.0000 [in_i]  

 

                Body temperature 2020 11:42:34 97.2000 [degF]  

 

                BMI             2020 11:29:07 25.7300         

 

                BP espana         2020 11:29:07 73.0000 mm[Hg]  

 

                Bdy height      2020 11:29:07 66.0000 [in_i]  

 

                Heart rate      2020 11:29:07 90.0000 /min    

 

                Resp rate       2020 11:29:07 16.0000 /min    

 

                BP sys          2020 11:29:07 132.0000 mm[Hg] 

 

                Body temperature 2020 11:29:07 97.2000 [degF]  

 

                Weight          2020 11:29:07 159.4000 [lb_av] 

 

                BMI             2020 11:25:41 25.1100         

 

                BP espana         2020 11:25:41 85.0000 mm[Hg]  

 

                Bdy height      2020 11:25:41 66.0000 [in_i]  

 

                SaO2% BldA PulseOx 2020 11:25:41 98.0000 %       

 

                Heart rate      2020 11:25:41 103.0000 /min   

 

                Resp rate       2020 11:25:41 20.0000 /min    

 

                BP sys          2020 11:25:41 125.0000 mm[Hg] 

 

                Body temperature 2020 11:25:41 97.3000 [degF]  

 

                Weight          2020 11:25:41 155.6000 [lb_av] 

 

                BMI             2020 10:04:06 24.5300         

 

                BP espana         2020 10:04:06 80.0000 mm[Hg]  

 

                Bdy height      2020 10:04:06 66.0000 [in_i]  

 

                SaO2% BldA PulseOx 2020 10:04:06 99.0000 %       

 

                Heart rate      2020 10:04:06 94.0000 /min    

 

                Resp rate       2020 10:04:06 16.0000 /min    

 

                BP sys          2020 10:04:06 119.0000 mm[Hg] 

 

                Body temperature 2020 10:04:06 99.0000 [degF]  

 

                Weight          2020 10:04:06 152.0000 [lb_av] 

 

                BMI             2020 08:41:07 24.6600         

 

                BP espana         2020 08:41:07 73.0000 mm[Hg]  

 

                Bdy height      2020 08:41:07 66.0000 [in_i]  

 

                SaO2% BldA PulseOx 2020 08:41:07 99.0000 %       

 

                Heart rate      2020 08:41:07 89.0000 /min    

 

                Resp rate       2020 08:41:07 16.0000 /min    

 

                BP sys          2020 08:41:07 116.0000 mm[Hg] 

 

                Body temperature 2020 08:41:07 97.9000 [degF]  

 

                Weight          2020 08:41:07 152.8000 [lb_av] 

 

                BMI             2020 09:30:29 24.7600         

 

                BP espana         2020 09:30:29 90.0000 mm[Hg]  

 

                Bdy height      2020 09:30:29 66.0000 [in_i]  

 

                SaO2% BldA PulseOx 2020 09:30:29 98.0000 %       

 

                Heart rate      2020 09:30:29 89.0000 /min    

 

                Resp rate       2020 09:30:29 16.0000 /min    

 

                BP sys          2020 09:30:29 138.0000 mm[Hg] 

 

                Body temperature 2020 09:30:29 98.1000 [degF]  

 

                Weight          2020 09:30:29 153.4000 [lb_av] 

 

                Bdy height      2020 09:27:26 66.0000 [in_i]  

 

                SaO2% BldA PulseOx 2020 09:27:26 99.0000 %       

 

                Heart rate      2020 09:27:26 92.0000 /min    

 

                Resp rate       2020 09:27:26 18.0000 /min    

 

                BP sys          2020 09:27:26 145.0000 mm[Hg] 

 

                Body temperature 2020 09:27:26 97.1000 [degF]  

 

                Weight          2020 09:27:26 151.0000 [lb_av] 

 

                BMI             2020 09:27:26 24.3700         

 

                BP espana         2020 09:27:26 96.0000 mm[Hg]  

 

                BMI             2020 09:06:06 24.2800         

 

                BP espana         2020 09:06:06 87.0000 mm[Hg]  

 

                Bdy height      2020 09:06:06 66.0000 [in_i]  

 

                SaO2% BldA PulseOx 2020 09:06:06 99.0000 %       

 

                Heart rate      2020 09:06:06 85.0000 /min    

 

                Resp rate       2020 09:06:06 18.0000 /min    

 

                BP sys          2020 09:06:06 127.0000 mm[Hg] 

 

                Body temperature 2020 09:06:06 98.0000 [degF]  

 

                Weight          2020 09:06:06 150.4000 [lb_av] 

 

                BMI             2020 09:02:19 24.0200         

 

                BP espana         2020 09:02:19 82.0000 mm[Hg]  

 

                Bdy height      2020 09:02:19 66.0000 [in_i]  

 

                SaO2% BldA PulseOx 2020 09:02:19 98.0000 %       

 

                Heart rate      2020 09:02:19 86.0000 /min    

 

                Resp rate       2020 09:02:19 18.0000 /min    

 

                BP sys          2020 09:02:19 135.0000 mm[Hg] 

 

                Body temperature 2020 09:02:19 98.0000 [degF]  

 

                Weight          2020 09:02:19 148.8000 [lb_av] 

 

                BMI             2020 10:46:18 22.9500         

 

                BP espana         2020 10:46:18 88.0000 mm[Hg]  

 

                Bdy height      2020 10:46:18 66.0000 [in_i]  

 

                SaO2% BldA PulseOx 2020 10:46:18 98.0000 %       

 

                Heart rate      2020 10:46:18 91.0000 /min    

 

                Resp rate       2020 10:46:18 16.0000 /min    

 

                BP sys          2020 10:46:18 135.0000 mm[Hg] 

 

                Body temperature 2020 10:46:18 97.6000 [degF]  

 

                Weight          2020 10:46:18 142.2000 [lb_av] 

 

                BMI             2020 08:55:23 24.0200         

 

                BP espana         2020 08:55:23 89.0000 mm[Hg]  

 

                Bdy height      2020 08:55:23 66.0000 [in_i]  

 

                SaO2% BldA PulseOx 2020 08:55:23 99.0000 %       

 

                Heart rate      2020 08:55:23 86.0000 /min    

 

                Resp rate       2020 08:55:23 16.0000 /min    

 

                BP sys          2020 08:55:23 142.0000 mm[Hg] 

 

                Body temperature 2020 08:55:23 97.5000 [degF]  

 

                Weight          2020 08:55:23 148.8000 [lb_av] 

 

                BMI             2020 12:31:28 23.0800         

 

                BP espana         2020 12:31:28 96.0000 mm[Hg]  

 

                Bdy height      2020 12:31:28 66.0000 [in_i]  

 

                SaO2% BldA PulseOx 2020 12:31:28 99.0000 %       

 

                Heart rate      2020 12:31:28 115.0000 /min   

 

                Resp rate       2020 12:31:28 16.0000 /min    

 

                BP sys          2020 12:31:28 157.0000 mm[Hg] 

 

                Body temperature 2020 12:31:28 99.4000 [degF]  

 

                Weight          2020 12:31:28 143.0000 [lb_av] 

 

                SYSTOLIC BLOOD PRESSURE 2018 10:56:00 136 mm[Hg]      

 

                DIASTOLIC BLOOD PRESSURE 2018 10:56:00 86 mm[Hg]       

 

                HEART RATE      2018 10:56:00 93 /min         

 

                HEIGHT          2018 10:56:00 167.6 cm        

 

                WEIGHT          2018 10:56:00 76.613 kg       







Hospital Discharge Instructions

Patient Instructions Ashley Ortiz CMA - 2020 11:00 AM [facility] Triage Line Contact Information The [facility] Triage Line is 
available Monday - Friday from 8 am - 5 pm at [phone]. There is always 
someone you can reach after 6 pm, on the weekend or during holidays. If you call
 the [facility]  at [phone], he or she will ask you for your 
provider's nameand your name. They will then contact your provider or the 
covering provider who can then discuss your concerns with you. For emergencies, 
please call 911 or report to the closest Emergency Department.Clinical Notes on 
My Chart: Progress notes documented by your healthcare team will now be 
available on the my chart portal. We believe that patients should be a part of 
the healthcare team. We encourage you to review notes after visits and in 
preparation for upcoming appointments. This provides the opportunity to review 
recommendations as well as to prepare questions for you healthcare team to 
address during your next visit. If you identify discrepancies in the 
documentation or have specific questions related to the notes, please bring them
 to your next scheduled visit to discuss with your physician, nurse 
practitioner, or physician assistant. With increased transparency, our hope is 
that we create more trust, better communication, more shared decision-making, 
and increased satisfaction. Please be aware that these notes will not be 
discussed over the phone or through My Chart messages. They willbe discussed 
only at your next office visit with your provider. Electronically signed by 
Ashley Ortiz CMA at 2020 10:59 AM EDT documented in this encounter
Patient Instructions Krystle Robert RN - 2020 3:45 PM EDT Light 
activity for 4-6 weeks after surgery to allow incisions heal - no strenuous 
activity - no lifting over 10lbs Daily range of motion exercises to arms as 
instructed by your doctor Walking daily is good for 15 minutes at a time 3-4 
times a day and increase as tolerated Wear soft bra or camisole daily as 
instructed by your doctor. Please do not wear underwire bra for at least 2 
months after surgery May shower daily and gently wash with soap and water, then 
pat dry. Please do not apply lotions or ointments to incisions unless instructed
 by your doctor Driving permitted by your provider - do not drive while taking 
narcotics May begin scar treatment 6 weeks from surgery by using cocoa butter, 
vitamin E, or mederma daily to scar.Please call the clinic triage nurse during 
normal business hours Monday - Friday from 8:30-4:00 withany questions or 
concerns at [phone] Please call [phone] to schedule or change 
appointments Our fax number is [phone] Call [phone] after hours (after
 4pm Monday thru Friday) and on weekends and ask for the plastic resident on 
call. Electronically signed by Krystle Robert RN at 2020 3:50 PM EDT
 documented in this encounterPatient Instructions Ashley Ortiz CMA - 
2020 1:00 PM EDTFormatting of this note might be different from the 
original. Please call with any questions or concerns prior to your next 
scheduled appointment. Contact Information Johanna Padgett MD and Gretchen Garcia NP Triage Nurse: [phone] Monday-Friday 8:00am-5pm * Prescription 
Refill Requests * Medication Authorizations * Test Results * Symptoms or Side 
Effects Please be prepared to provide your name, date of birth, medical record 
number, and doctors name, along with a brief description of your question or 
non-urgent problem. Paging : [phone] If you have an urgent 
problem or have a question or concern after 6pm, during the weekend, or on a 
holiday, please call this number and ask the  to page the surgical resi
dent on call. Appointment Hub: [phone] Monday-Friday 7:30am-4:30pm If you
 have need to make changes to your appointments or have questions or concerns 
regarding your appointments, please contactthe appointment hub. Disbility / FMLA
 Paperwork: Please allow a 2 week turnaround time for all paperwork submitted. 
Fax: [phone] Insurance Concerns Financial Care Counselor Tel: [phone]
[phone] Highlights from your Breast Team Please note that when scheduling your 
follow-up appointments in the high risk clinic, you may be seen by any of our 
excellent providers! We have openings on  from Noon to 4pm and  
from 8:30am to 4:30pm. If you ever need to change your appointment, please feel 
free to contact our scheduling HUB at [phone] and select option #2. Do you 
feel like you need more time during your visits to discuss your concerns? Have 
you ever wondered how other high risk patients handle these visits and concerns?
 Consider attending a GROUP VISIT for our high risk patients! The [facility] ([facility]) offers group visits for women in our high risk clinic! 
Wehope you will join the BRAC Team for one of our weekly friendly and informal 
group visits, which areavailable for free as a part of your clinic visit at the 
[facility]. Discussion topics will be directed by your requests and 
focus on topics such as how risk is calculated, breast surveillance, risk 
management options, and dealing with anxiety often associated with high risk 
status. These sessions are held on  from Noon to 1pm and are led by one
 of our nurse practitioners (Rosio Schumacher DNP). Light refreshments will be 
provided. Please note, these group sessions are currently ON HOLD due to COVID-
19, but we plan to resume them in the . In addition, we offer a free
 monthly virtual webinar. During both the webinar and the in-person weekly 
sessions, we will continue toprovide additional breast-related education, 
support, and healthcare in these fun and interactive group visit formats. 
Discussion topics will be directed by your requests and will include featured 
guest speakers from various [facility] (see tentative schedule 
below). Practical solutions will be offered with invited special guests such as 
nutritionists, radiologists, genetic counselors, exercise physiologists, and 
other specialists. These online webinars will resume on Tuesday, 2020. For more information or to sign up for these interactive and focused group
 visits, please email [email or web address] or call our office at 
[phone]. When contacting us, please provideyour name, [facility] medical record 
number (if known), and date of birth. If you are planning to join thewebinar 
(online only) you do not need to preregister. Please use the link below to join.
 You will need zoom software, which can be downloaded easily to your phone, 
laptop, or desktop in a few minutes. Instructions will automatically appear when
 you click the link to join. This link also provides a phone number for dial in,
 if you prefer or are not at your desk. Live Group Telehealth Webinars 12-1pm ET
:  - 2020 ~ Break for the Summer 2020 with focus on 
Breast Reconstruction 2020 with focus on Breast Research November 3, 
2020 (topic TBD) 2020 (topic TBD)Wishing you good health! Your [facility] 
[facility] Team -Rosio Schumacher DNP -Gretchen Garcia NP -MD Yashira Patterson is inviting you to a scheduled Zoom meeting. Topic: Breast Health 
Webinar withNISHI Schumacher NP Time: Eastern Time ([place] and [place]) Every month on the 
First Tuesday Please download and import the following Reflex[employer] Systemslendar (.ics) files to
 your calendar system. Monthly: [email or web address]

[id number] Join Zoom Meeting 
[email or web address] Meeting ID: [id number] One tap mobile 
[phone]# [place] ([place]) [phone]# [place] ([place] 
Dial by your location +9   ([place]) +2  [place] ([place] +0   [place] Meeting ID: [id number] Find your local 
number: [email or web address] Upcoming Community Events Join [facility] and the [facility] in our free annual community symposium, 
"What's best for breasts?", which will be held virtually (online only) on 
2020! For more information, visit our website at: 
[email or web address] To register for the event, go to the 
registration website at: [email or web address] 
--------------------------------
--------------------------------------------------------------------------------

-------------------------------------------------------------------- Join the 
[facility]'s Breast Team and theAmerican Cancer Society at the Making
 Strides Against Breast Cancer event in [place] on 2020!
 For more information, visit the [employer] website at: 
http://www.[email or web address]/site/TR?fr_id=8374&amp;pg=entry 
-----------------------------------------------------------------------------
--------------------------------------------------------------------------------

----------------------- Join the [facility]'s Breast Team and the 
American Cancer Society at the Making Strides Against Breast Cancer event in 
[place] on 2020! For more information, visit the ACS 
website at: https://secure.[employer].org/site/STR?pg=entry&amp;fr_id=90949 
--------------------
--------------------------------------------------------------------------------

--------------------------------------------------------------------------------
 Women's Health Awareness is a FREE annual women's wellness conference offered 
since . This free event is open to ALL women in the [place] and surrounding
 region. It provides health awareness, education, information, resources, and 
on-site health screenings. This year's virtual event will be held in 2020. For more information, visit the A website at: 
https://tools.niehs.nih.gov/conference/womenshealth_2020/index.cfm -----------
--------------------------------------------------------------------------------

---------------------
-------------------------------------------------------------------- For more 
information about all our community outreach, visit our website at: 
[email or web address]/ -------------------------
--------------------------------------------------------------------------------

--------------------------------------------------------------------------- 
Patient Education Exercising After a Mastectomy: Care Instructions Your Care 
Instructions During a mastectomy, your doctor removed your breast andmay have 
removed some of the lymph nodes from under your arm. You may feel some pain 
going down yourarm. Your shoulder and arm may be stiff and hard to move. You may
 also have some loss of feeling there. Take good care of your arm on the side of
 your surgery. Your doctor or physical therapist can teach you arm exercises 
that will let you move your arm as you always have. But be careful not to 
overuse your arm. For example, do not lift anything heavy with your arm until 
your doctor says it is okay. The basic exercises described here will help you 
start moving your arm. Follow-up care is a key part of your treatment and 
safety. Be sure to make and go to all appointments, and call your doctor if you
are having problems. It's also a good idea to know your test results and keep a 
list of the medicines you take. Before you start to exercise  Do not start to 
exercise until your doctor says it is okay. If you are not sure how to do an 
exercise, do not start it until your doctor or physical therapistshows you 
exactly how to do it.  Stop exercising if your arm or chest area hurts or 
begins to swell. Talk with your doctor about your symptoms. Also call your 
doctor if you have problems doing an exercise or it hurts.  Wear loose-fitting
 clothing while you do your exercises.  Use your arm for your usual 
activities, such as brushing your teeth and hair. How to do shoulder relaxation 
exercises How to do shoulder relaxation exercises 1. Try one or both of these 
exercises to relax your shoulders:  Shoulder shrugs: While you sit or stand, 
lift your shoulders up toward your ears. Relax your shoulders, and let them drop
 to their usual position.  Shoulder rolls: While you sit or stand, roll your
shoulders from front to back and up to down. How to do a shoulder blade stretch 
1. While you sit, stretch your arms behind your back, and grasp your hands 
together. 2. Hold for 5 seconds. 3. Repeat 2 or 3 times. How to do an overhead 
reach 1. While you sit or stand, clasp your hands together and extend your 
elbows. 2. Lift your arms up toward your head. 3. Hold for 3 seconds. 4. Repeat 
2 or 3 times. How to do a shoulder pinch 1. While you sit or stand, clasp your 
hands behind your head. 2. Bring your elbows back, and squeeze your shoulder 
blades together. Keep your shoulders back and down. 3. Hold for 5 seconds. 4. 
Repeat 2 or 3 times. How to do a wall climb 1. While you stand, face a wall, 
with your hands on the wall. 2. Walk your fingers up the wall until you feel a 
stretch. 3. Hold that position for 5 seconds. 4. Slowly walk your fingers back 
down to the starting position. 5. Repeat 2 or 3 times. How to do elbow circles 
1. While you sit or stand, put your right hand on your right shoulder, and your 
left hand on your left shoulder. 2. Raise your elbows until you feel a stretch. 
3. Make circles with your elbows. Start small and then make larger circles. 4. 
Change direction with your circles.5. Repeat 2 or 3 times. Where can you learn 
more? Log in to your [employer] account at [email or web address]://www."[employer]org and 
click on top menu option "Health" then select "Search Medical Library". Enter 
X573 in the search box and click the magnify glass to learn more about 
"Exercising After a Mastectomy: Care Instructions." Current as of: 2019Content Version: 12.5 5143-6227 [employer]. Care instructions adapted under license by your healthcare pro
fessional. If you have questions about a medical condition or this instruction, 
always ask your healthcare professional. [employer] disclaims any 
warranty or liability for your use of this information. Electronically signed by
 Johanna Padgett MD at 2020 11:12 AM EDT documented in this 
encounterPatient Instructions Viviana Garcia CMA - 2020 2:30 PM Tomas Lan's office number is [phone] Please call the clinic triage nurse 
during normal business hours Monday - Friday from 8:30-4:00 with any questions 
or concerns at [phone] Please call [phone] to schedule or change appo
intments Our fax number is [phone] Call [phone] after hours (after 4pm
 Monday thru Friday)and on weekends and ask for the plastic resident on call. 
Continue to empty and record drain output.Use abdominal pads as needed for 
cushion. Electronically signed by Kristyn Queen RN at 20203:04 PM EDT 
documented in this encounterPatient Instructions Krystle Robert RN - 
2020 8:45 AM Tomas Lan's office number is [phone] Please call the
 clinic triage nurse during normal business hours Monday - Friday from 8:30-4:00
 with any questions or concerns at [phone] Please call [phone] to 
schedule or change appointments Our fax number is [phone] Call [phone]
 after hours (after 4pm Monday thru Friday) and on weekends and ask for the 
plastic resident on call. - Surgery is scheduled for today at [facility] 
[facility]gery Center. You will go home tomorrow - No eating or drinking (no gum or 
hard candy)after midnight the night before surgery. You may have clear liquids 
(water) up to 2 hours before youarrive at the hospital - No Ibuprofen or herbal 
supplements 1-2 weeks prior to surgery (including aspirin, Aleve, Naproxen, 
Vitamin E supplements, Fish Oil, Green tea) - - If you are receiving Zolidex i
njections and are scheduled for any abdominal surgery - please avoid using the 
abdomen as an injection site 2 weeks prior to surgery - Please stop all hormone 
medications 4 weeks prior to surgery and 4weeks after surgery (birth control 
pills, estrogen, estradiol, progesterone, Nuvaring) - May have tylenol prior to 
surgery - Please shower the evening before and morning of your surgery using the
 soap provided to you in pre-op screening or use a anti-bacterial soap (Dial or 
Safeguard). No perfume, lotions, makeup, deodorant, hairspray, or hair gels - No
 drinking alcohol 24 hours before surgery - NO smoking prior to surgery - If you
 develop a cold, fever, or respiratory problem in the days prior to your 
scheduled surgery - please call your physician's office - You must have a 
responsible adult  the day of surgery. After surgery: - Light activity. 
Incision takes 6 weeks to fully heal - Walkingdaily is recommended and per 
surgeon's orders. Ambulating daily helps with preventing blood clots, breathing 
problems, and gets your bowels moving. - Good nutrition and good protein intake 
is recommended - Pain control - please ask your nurse for pain medicine while in
 the hospital as needed - Blood Clot prevention - Ambulating daily helps prevent
 blood clots - Gum chewing - Chewing gum after surgeryhelps wake up your stomach
 after surgery - may chew gum 2-3 times a day - Deep breathing - You will be 
shown how to use an incentive spirometer which helps you take deep breaths every
 1-2 hours while awake and helps prevent pneumonia - You will have drains when 
you wake up from surgery. Please empty and record the output daily and bring 
record to your post-op visit. - Showering after surgery will be up to your 
surgeon usually 2-3 days after surgery. Wash incision daily with soap and water,
 then pat dry. Please do not apply ointments or creams to incision unless 
instructed by your surgeon - Please do not drive until instructed by your 
surgeon. Please DO NOT drive while taking narcotics(pain meds) -No swimming or 
tub baths for 6 weeks from surgery Drain Care: Continue emptying and recording 
drain output daily. Drain output needs to be less than 30ml in a 24 hour period 
for 2 consecutive days per drain to have drain removed. Light activity for 4-6 
weeks after surgery to allow incisions heal - no strenuous activity - no lifting
 over 10lbs Daily range of motion exercises to arms as instructed by your doctor
 Walking daily is good for 15 minutes at a time 3-4 times a day and increase as 
tolerated Wear soft bra or camisole daily as instructed by your doctor. Please 
do not wear underwire bra for at least 2 months after surgery May shower daily 
and gently wash with soap and water, then pat dry. Please do not apply lotions 
or ointments to incisions unless instructed by your doctor Driving permitted by 
your provider - do not drive while taking narcotics May begin scar treatment 6 
weeks from surgery by using cocoa butter, vitamin E, or mederma daily to scar. 
Please call the clinic triage nurse during normal business hours Monday - Friday
 from 8:30-4:00 with any questions or concerns at [phone] call 
[phone] to schedule or change appointments Our fax number is [phone] 
Call [phone] after hours (after 4pm Monday thru Friday) and on weekends and
 ask for the plastic resident on call. Electronically signed by Krystle Robert RN at 2020 7:02 AM EDT documented in this encounterPatient 
Instructions Anabel Gamble RN - 08/10/2020 11:25 AM EDTFormatting of 
this note might be different from the original. COVID-19 Infection With Mild or 
No Symptoms-[facility]  Patient Education [employer] [place] approved 2020 
(revised in May 2020)  COVID-19 Infection With Mild or No Symptoms-[facility]  
Developed and approved specifically for [facility] patients and their loved ones. 
 Not intended for distribution or use by individuals outside of [facility]  
Electronically signed by Anabel Gamble RN at 08/10/2020 10:30 AM EDT 
documented in this encounterPatient Instructions Elis Uribe PA - 
2020 10:30 AM EDTFormatting of this note might be different from the 
original. Medication instructions prior to procedure: Medication Sig INSTRUCTI
ONS  ALPRAZolam (XANAX) 0.5 MG tablet Take 0.5 mg by mouth 3 (three) times a 
day TAKE day of procedure  ascorbic acid/vitamin E/biotin (HAIR, SKIN, NAILS 
WITH BIOTIN ORAL) Take 3 capsules by mouth once daily STOP taking 7 days prior 
to procedure  cetirizine (ZYRTEC) 10 MG tablet Take 10 mg bymouth once daily 
TAKE day of procedure, if needed  fluticasone propionate (FLONASE) 50 
mcg/actuation nasal spray Place 2 sprays into both nostrils once daily TAKE day 
of procedure  omeprazole (PRILOSEC) 40 MG DR capsule Take 40 mg by mouth once
 daily TAKE day of procedure  pediatric multivitamin (FLINTSTONES/EXTRA C) 
chewable tablet Take 1 tablet by mouth once daily STOP taking 7 days prior to 
procedure  penicillin v potassium 500 MG tablet Take 500 mg by mouth every 6 
(six) hours For 10days Will finish prior to surgery  sertraline (ZOLOFT) 100 
MG tablet Take 150 mg by mouth once daily TAKE day of procedure  VITAMIN D3 
ORAL Take 5,000 Units by mouth once daily DO NOT TAKE day ofprocedure 
Electronically signed by Elis Uribe PA at 2020 10:37 AM EDT 
documented in this encounter